# Patient Record
Sex: FEMALE | Race: BLACK OR AFRICAN AMERICAN | NOT HISPANIC OR LATINO | Employment: FULL TIME | URBAN - METROPOLITAN AREA
[De-identification: names, ages, dates, MRNs, and addresses within clinical notes are randomized per-mention and may not be internally consistent; named-entity substitution may affect disease eponyms.]

---

## 2023-08-21 PROBLEM — R13.10 DYSPHAGIA: Status: ACTIVE | Noted: 2023-08-21

## 2023-08-21 PROBLEM — E78.5 HLD (HYPERLIPIDEMIA): Status: ACTIVE | Noted: 2023-08-21

## 2023-08-21 PROBLEM — R09.A2 GLOBUS SENSATION: Status: ACTIVE | Noted: 2023-08-21

## 2023-08-21 PROBLEM — M67.441 GANGLION OF FLEXOR TENDON SHEATH OF RIGHT RING FINGER: Status: ACTIVE | Noted: 2023-08-21

## 2023-08-21 PROBLEM — M76.822 POSTERIOR TIBIAL TENDINITIS OF LEFT LEG: Status: ACTIVE | Noted: 2023-08-21

## 2023-08-21 PROBLEM — M79.602 PAIN OF LEFT UPPER EXTREMITY: Status: ACTIVE | Noted: 2023-08-21

## 2023-08-21 PROBLEM — Z20.822 SUSPECTED COVID-19 VIRUS INFECTION: Status: ACTIVE | Noted: 2023-08-21

## 2023-08-21 PROBLEM — R03.0 ELEVATED BLOOD-PRESSURE READING, WITHOUT DIAGNOSIS OF HYPERTENSION: Status: ACTIVE | Noted: 2023-08-21

## 2023-08-21 PROBLEM — J45.901 MODERATE ASTHMA WITH ACUTE EXACERBATION (HHS-HCC): Status: ACTIVE | Noted: 2023-08-21

## 2023-08-21 PROBLEM — L20.9 ATOPIC DERMATITIS: Status: ACTIVE | Noted: 2023-08-21

## 2023-08-21 PROBLEM — I10 HYPERTENSIVE DISORDER: Status: ACTIVE | Noted: 2023-08-21

## 2023-08-21 PROBLEM — F32.A DEPRESSION: Status: ACTIVE | Noted: 2023-08-21

## 2023-08-21 PROBLEM — N95.2 VAGINAL ATROPHY: Status: ACTIVE | Noted: 2023-08-21

## 2023-08-21 PROBLEM — M53.3 SACROILIAC JOINT PAIN: Status: ACTIVE | Noted: 2023-08-21

## 2023-08-21 PROBLEM — M79.18 MYALGIA, OTHER SITE: Status: ACTIVE | Noted: 2023-08-21

## 2023-08-21 PROBLEM — R29.898 ARM WEAKNESS: Status: ACTIVE | Noted: 2023-08-21

## 2023-08-21 PROBLEM — N02.9 THIN BASEMENT MEMBRANE DISEASE: Status: ACTIVE | Noted: 2023-08-21

## 2023-08-21 PROBLEM — R39.9 UTI SYMPTOMS: Status: ACTIVE | Noted: 2023-08-21

## 2023-08-21 PROBLEM — G54.0 THORACIC OUTLET SYNDROME: Status: ACTIVE | Noted: 2023-08-21

## 2023-08-21 PROBLEM — M79.673 FOOT PAIN: Status: ACTIVE | Noted: 2023-08-21

## 2023-08-21 PROBLEM — M54.6 THORACIC SPINE PAIN: Status: ACTIVE | Noted: 2023-08-21

## 2023-08-21 PROBLEM — M54.30 SCIATICA: Status: ACTIVE | Noted: 2023-08-21

## 2023-08-21 PROBLEM — M99.09 SEGMENTAL AND SOMATIC DYSFUNCTION: Status: ACTIVE | Noted: 2023-08-21

## 2023-08-21 PROBLEM — F43.21 ADJUSTMENT DISORDER WITH DEPRESSED MOOD: Status: ACTIVE | Noted: 2023-08-21

## 2023-08-21 PROBLEM — N95.1 PERIMENOPAUSAL: Status: ACTIVE | Noted: 2023-08-21

## 2023-08-21 PROBLEM — N95.1 VASOMOTOR SYMPTOMS DUE TO MENOPAUSE: Status: ACTIVE | Noted: 2023-08-21

## 2023-08-21 PROBLEM — B35.6 TINEA CRURIS: Status: ACTIVE | Noted: 2023-08-21

## 2023-08-21 PROBLEM — M21.42 ACQUIRED PES PLANUS OF BOTH FEET: Status: ACTIVE | Noted: 2023-08-21

## 2023-08-21 PROBLEM — J98.8 RESPIRATORY TRACT INFECTION: Status: ACTIVE | Noted: 2023-08-21

## 2023-08-21 PROBLEM — R53.83 FATIGUE: Status: ACTIVE | Noted: 2023-08-21

## 2023-08-21 PROBLEM — M20.11 ACQUIRED HALLUX VALGUS OF RIGHT FOOT: Status: ACTIVE | Noted: 2023-08-21

## 2023-08-21 PROBLEM — B96.89 BACTERIAL VAGINOSIS: Status: ACTIVE | Noted: 2023-08-21

## 2023-08-21 PROBLEM — M54.50 LUMBAR PAIN: Status: ACTIVE | Noted: 2023-08-21

## 2023-08-21 PROBLEM — M54.2 CERVICALGIA: Status: ACTIVE | Noted: 2023-08-21

## 2023-08-21 PROBLEM — E04.1 THYROID NODULE: Status: ACTIVE | Noted: 2023-08-21

## 2023-08-21 PROBLEM — R10.2 FEMALE PELVIC PAIN: Status: ACTIVE | Noted: 2023-08-21

## 2023-08-21 PROBLEM — M25.561 RIGHT KNEE PAIN: Status: ACTIVE | Noted: 2023-08-21

## 2023-08-21 PROBLEM — S76.019A MUSCLE STRAIN OF GLUTEAL REGION: Status: ACTIVE | Noted: 2023-08-21

## 2023-08-21 PROBLEM — R51.9 HEADACHE: Status: ACTIVE | Noted: 2023-08-21

## 2023-08-21 PROBLEM — S46.812A STRAIN OF LEFT TRAPEZIUS MUSCLE: Status: ACTIVE | Noted: 2023-08-21

## 2023-08-21 PROBLEM — S83.249A MEDIAL MENISCUS TEAR: Status: ACTIVE | Noted: 2023-08-21

## 2023-08-21 PROBLEM — A60.00 GENITAL HERPES: Status: ACTIVE | Noted: 2023-08-21

## 2023-08-21 PROBLEM — M17.0 LOCALIZED OSTEOARTHRITIS OF BOTH KNEES: Status: ACTIVE | Noted: 2023-08-21

## 2023-08-21 PROBLEM — M41.9 SCOLIOSIS: Status: ACTIVE | Noted: 2023-08-21

## 2023-08-21 PROBLEM — B37.9 YEAST INFECTION: Status: ACTIVE | Noted: 2023-08-21

## 2023-08-21 PROBLEM — N89.8 VAGINAL DISCHARGE: Status: ACTIVE | Noted: 2023-08-21

## 2023-08-21 PROBLEM — M25.562 KNEE PAIN, BILATERAL: Status: ACTIVE | Noted: 2023-08-21

## 2023-08-21 PROBLEM — J45.909 ASTHMA (HHS-HCC): Status: ACTIVE | Noted: 2023-08-21

## 2023-08-21 PROBLEM — M25.561 KNEE PAIN, BILATERAL: Status: ACTIVE | Noted: 2023-08-21

## 2023-08-21 PROBLEM — R22.0 LOCALIZED SWELLING, MASS AND LUMP, HEAD: Status: ACTIVE | Noted: 2023-08-21

## 2023-08-21 PROBLEM — R31.9 HEMATURIA: Status: ACTIVE | Noted: 2023-08-21

## 2023-08-21 PROBLEM — M25.551 RIGHT HIP PAIN: Status: ACTIVE | Noted: 2023-08-21

## 2023-08-21 PROBLEM — M62.81 MUSCLE WEAKNESS (GENERALIZED): Status: ACTIVE | Noted: 2023-08-21

## 2023-08-21 PROBLEM — N89.8 VAGINAL ITCHING: Status: ACTIVE | Noted: 2023-08-21

## 2023-08-21 PROBLEM — R32 LEAKING OF URINE: Status: ACTIVE | Noted: 2023-08-21

## 2023-08-21 PROBLEM — M22.41 CHONDROMALACIA OF RIGHT PATELLA: Status: ACTIVE | Noted: 2023-08-21

## 2023-08-21 PROBLEM — M21.41 ACQUIRED PES PLANUS OF BOTH FEET: Status: ACTIVE | Noted: 2023-08-21

## 2023-08-21 PROBLEM — N76.0 BACTERIAL VAGINOSIS: Status: ACTIVE | Noted: 2023-08-21

## 2023-08-21 PROBLEM — S46.919A MUSCLE STRAIN, SHOULDER REGION: Status: ACTIVE | Noted: 2023-08-21

## 2023-08-21 PROBLEM — F41.9 ANXIETY DISORDER: Status: ACTIVE | Noted: 2023-08-21

## 2023-08-21 PROBLEM — M22.42 CHONDROMALACIA OF LEFT PATELLA: Status: ACTIVE | Noted: 2023-08-21

## 2023-08-21 PROBLEM — M62.838 MUSCLE SPASMS OF NECK: Status: ACTIVE | Noted: 2023-08-21

## 2023-08-21 PROBLEM — M76.821 POSTERIOR TIBIAL TENDINITIS OF RIGHT LEG: Status: ACTIVE | Noted: 2023-08-21

## 2023-08-21 PROBLEM — L30.9 ECZEMA: Status: ACTIVE | Noted: 2023-08-21

## 2023-08-21 RX ORDER — ESTRADIOL 10 UG/1
INSERT VAGINAL
COMMUNITY
Start: 2023-04-07 | End: 2023-08-22 | Stop reason: ALTCHOICE

## 2023-08-21 RX ORDER — MOMETASONE FUROATE AND FORMOTEROL FUMARATE DIHYDRATE 50; 5 UG/1; UG/1
AEROSOL RESPIRATORY (INHALATION)
COMMUNITY
End: 2023-08-22 | Stop reason: SDUPTHER

## 2023-08-21 RX ORDER — VALACYCLOVIR HYDROCHLORIDE 500 MG/1
TABLET, FILM COATED ORAL
COMMUNITY
Start: 2023-01-17 | End: 2024-03-19 | Stop reason: SDUPTHER

## 2023-08-21 RX ORDER — CLOTRIMAZOLE AND BETAMETHASONE DIPROPIONATE 10; .64 MG/G; MG/G
1 CREAM TOPICAL 2 TIMES DAILY
COMMUNITY
Start: 2023-01-09

## 2023-08-21 RX ORDER — ESTRADIOL 0.03 MG/D
PATCH TRANSDERMAL
COMMUNITY
Start: 2023-03-08 | End: 2023-11-27 | Stop reason: WASHOUT

## 2023-08-21 RX ORDER — MULTIVITAMIN
1 TABLET ORAL DAILY
COMMUNITY

## 2023-08-21 RX ORDER — HYALURONATE,MOD.,NON-CROSSLINK 24 MG/3 ML
SYRINGE (ML) INTRAARTICULAR
COMMUNITY
Start: 2021-11-18

## 2023-08-21 RX ORDER — CRISABOROLE 20 MG/G
OINTMENT TOPICAL
COMMUNITY

## 2023-08-21 RX ORDER — ALBUTEROL SULFATE 90 UG/1
AEROSOL, METERED RESPIRATORY (INHALATION)
COMMUNITY
Start: 2019-05-24

## 2023-08-21 RX ORDER — OXYBUTYNIN CHLORIDE 5 MG/1
1 TABLET ORAL DAILY
COMMUNITY
Start: 2023-04-03 | End: 2023-08-22 | Stop reason: ALTCHOICE

## 2023-08-22 ENCOUNTER — OFFICE VISIT (OUTPATIENT)
Dept: PRIMARY CARE | Facility: CLINIC | Age: 43
End: 2023-08-22
Payer: COMMERCIAL

## 2023-08-22 ENCOUNTER — LAB (OUTPATIENT)
Dept: LAB | Facility: LAB | Age: 43
End: 2023-08-22
Payer: COMMERCIAL

## 2023-08-22 VITALS
BODY MASS INDEX: 30.95 KG/M2 | DIASTOLIC BLOOD PRESSURE: 92 MMHG | HEIGHT: 67 IN | OXYGEN SATURATION: 97 % | SYSTOLIC BLOOD PRESSURE: 132 MMHG | WEIGHT: 197.2 LBS | RESPIRATION RATE: 18 BRPM | HEART RATE: 79 BPM

## 2023-08-22 DIAGNOSIS — N02.9 THIN BASEMENT MEMBRANE DISEASE: ICD-10-CM

## 2023-08-22 DIAGNOSIS — Z72.51 HIGH RISK HETEROSEXUAL BEHAVIOR: ICD-10-CM

## 2023-08-22 DIAGNOSIS — Z00.00 ANNUAL PHYSICAL EXAM: Primary | ICD-10-CM

## 2023-08-22 DIAGNOSIS — J45.909 MILD ASTHMA, UNSPECIFIED WHETHER COMPLICATED, UNSPECIFIED WHETHER PERSISTENT (HHS-HCC): ICD-10-CM

## 2023-08-22 PROBLEM — R39.9 UTI SYMPTOMS: Status: RESOLVED | Noted: 2023-08-21 | Resolved: 2023-08-22

## 2023-08-22 PROCEDURE — 1036F TOBACCO NON-USER: CPT | Performed by: STUDENT IN AN ORGANIZED HEALTH CARE EDUCATION/TRAINING PROGRAM

## 2023-08-22 PROCEDURE — 99213 OFFICE O/P EST LOW 20 MIN: CPT | Performed by: STUDENT IN AN ORGANIZED HEALTH CARE EDUCATION/TRAINING PROGRAM

## 2023-08-22 PROCEDURE — 36415 COLL VENOUS BLD VENIPUNCTURE: CPT

## 2023-08-22 PROCEDURE — 3075F SYST BP GE 130 - 139MM HG: CPT | Performed by: STUDENT IN AN ORGANIZED HEALTH CARE EDUCATION/TRAINING PROGRAM

## 2023-08-22 PROCEDURE — 3008F BODY MASS INDEX DOCD: CPT | Performed by: STUDENT IN AN ORGANIZED HEALTH CARE EDUCATION/TRAINING PROGRAM

## 2023-08-22 PROCEDURE — 3080F DIAST BP >= 90 MM HG: CPT | Performed by: STUDENT IN AN ORGANIZED HEALTH CARE EDUCATION/TRAINING PROGRAM

## 2023-08-22 PROCEDURE — 99396 PREV VISIT EST AGE 40-64: CPT | Performed by: STUDENT IN AN ORGANIZED HEALTH CARE EDUCATION/TRAINING PROGRAM

## 2023-08-22 PROCEDURE — 87389 HIV-1 AG W/HIV-1&-2 AB AG IA: CPT

## 2023-08-22 RX ORDER — MOMETASONE FUROATE AND FORMOTEROL FUMARATE DIHYDRATE 50; 5 UG/1; UG/1
2 AEROSOL RESPIRATORY (INHALATION) DAILY
Qty: 13 G | Refills: 6 | Status: SHIPPED | OUTPATIENT
Start: 2023-08-22 | End: 2023-08-29

## 2023-08-22 RX ORDER — SEMAGLUTIDE 0.5 MG/.5ML
0.5 INJECTION, SOLUTION SUBCUTANEOUS
Qty: 2 ML | Refills: 0 | Status: SHIPPED | OUTPATIENT
Start: 2023-08-22 | End: 2023-09-11 | Stop reason: SDUPTHER

## 2023-08-22 ASSESSMENT — PATIENT HEALTH QUESTIONNAIRE - PHQ9: 2. FEELING DOWN, DEPRESSED OR HOPELESS: NOT AT ALL

## 2023-08-22 NOTE — PROGRESS NOTES
History Of Present Illness  Kriss Jhaveri is a 43 y.o. female presenting cpe.    Has been having HSV-2 flares every month, 3 outbreaks since   Started estrogen 1/2 patch  Valcyclovir daily for suppressive therapy    Taking aberden for menopausal       Pain in the groin area  Swollen gland, knot, tenderness in the area   One sided   For the past week         Back to working out   Starting wegovy      Past Medical History  She has a past medical history of Abnormal uterine and vaginal bleeding, unspecified (11/09/2015), Acute maxillary sinusitis, unspecified (04/02/2016), Acute pharyngitis, unspecified (02/24/2016), Acute vaginitis (08/15/2017), Acute vaginitis (03/02/2022), Acute vaginitis (02/02/2018), Contact with and (suspected) exposure to infections with a predominantly sexual mode of transmission (03/26/2014), Cough, unspecified (04/23/2016), Cough, unspecified (09/29/2015), Disruption of external operation (surgical) wound, not elsewhere classified, initial encounter (02/16/2016), Encounter for gynecological examination (general) (routine) without abnormal findings (08/15/2017), Encounter for gynecological examination (general) (routine) without abnormal findings (09/06/2019), Encounter for screening for infections with a predominantly sexual mode of transmission (09/28/2021), Encounter for screening for lipoid disorders (05/06/2016), Nausea (09/17/2020), Other acute postprocedural pain (01/22/2016), Other conditions influencing health status, Other specified abnormal findings of blood chemistry (07/14/2016), Other specified noninflammatory disorders of uterus, Other specified postprocedural states (05/10/2016), Pain in left knee (04/27/2016), Pain in unspecified limb, Personal history of other diseases of the circulatory system (03/26/2014), Personal history of other diseases of the digestive system (02/16/2016), Personal history of other diseases of the female genital tract (08/04/2015), Personal history  of other diseases of the female genital tract (11/09/2015), Personal history of other diseases of the female genital tract (03/20/2015), Personal history of other diseases of the musculoskeletal system and connective tissue, Personal history of other diseases of the respiratory system (02/24/2016), Personal history of other diseases of the respiratory system (03/04/2018), Personal history of other diseases of the respiratory system, Personal history of other infectious and parasitic diseases (09/28/2021), Personal history of other infectious and parasitic diseases (10/12/2016), Personal history of other medical treatment, Personal history of other specified conditions (05/01/2021), Personal history of other specified conditions (02/08/2015), Personal history of other specified conditions (12/22/2015), Personal history of other specified conditions (01/16/2015), Personal history of other specified conditions (09/17/2020), Personal history of other specified conditions, Personal history of other specified conditions (06/05/2017), Personal history of other specified conditions, Personal history of other specified conditions, Personal history of other specified conditions (04/28/2016), Personal history of urinary (tract) infections (08/30/2014), Postpartum depression (06/20/2013), Radiculopathy, cervical region (01/06/2021), Strain of unspecified muscle, fascia and tendon at shoulder and upper arm level, left arm, initial encounter (01/22/2021), Streptococcal pharyngitis (03/04/2018), Tachycardia, unspecified, Unspecified asthma with (acute) exacerbation (11/14/2016), Unspecified asthma with (acute) exacerbation (02/27/2020), Unspecified asthma, uncomplicated (02/08/2015), Unspecified pre-eclampsia, unspecified trimester, and Unspecified symptoms and signs involving the genitourinary system (12/14/2021).    Surgical History  She has a past surgical history that includes Other surgical history (06/20/2013); Tympanostomy  tube placement (2013); Other surgical history (2013); Other surgical history (2015);  section, classic (2018); and Other surgical history (2015).     Social History  She reports that she has never smoked. She has never used smokeless tobacco. She reports current alcohol use. She reports that she does not use drugs.    Family History  Family History   Problem Relation Name Age of Onset    Hypertension Mother      Diabetes Mother      Heart disease Father      Lung disease Father          Allergies  Delsym    Review of Systems     Physical Exam  Constitutional:       General: She is not in acute distress.     Appearance: She is not ill-appearing.   HENT:      Right Ear: Tympanic membrane and ear canal normal.      Left Ear: Tympanic membrane and ear canal normal.      Mouth/Throat:      Mouth: Mucous membranes are moist.      Pharynx: Oropharynx is clear. No oropharyngeal exudate or posterior oropharyngeal erythema.   Eyes:      Extraocular Movements: Extraocular movements intact.      Conjunctiva/sclera: Conjunctivae normal.      Pupils: Pupils are equal, round, and reactive to light.   Neck:      Vascular: No carotid bruit.   Cardiovascular:      Rate and Rhythm: Normal rate and regular rhythm.      Heart sounds: No murmur heard.     No gallop.   Pulmonary:      Effort: Pulmonary effort is normal.      Breath sounds: Normal breath sounds. No wheezing, rhonchi or rales.   Abdominal:      General: Abdomen is flat. Bowel sounds are normal.      Palpations: Abdomen is soft.      Tenderness: There is no abdominal tenderness.   Musculoskeletal:      Cervical back: Neck supple.      Left lower leg: No edema.   Skin:     General: Skin is warm and dry.      Findings: No rash.   Neurological:      General: No focal deficit present.      Mental Status: She is alert and oriented to person, place, and time.      Gait: Gait normal.   Psychiatric:         Mood and Affect: Mood normal.          Behavior: Behavior normal.          Last Recorded Vitals  BP (!) 132/92   Pulse 79   Resp 18   Wt 89.4 kg (197 lb 3.2 oz)   SpO2 97%     Relevant Results  Current Outpatient Medications:     albuterol 90 mcg/actuation inhaler, Inhale., Disp: , Rfl:     BACILLUS COAGULANS-INULIN ORAL, Take 1 capsule by mouth once daily., Disp: , Rfl:     clotrimazole-betamethasone (Lotrisone) cream, Apply 1 Film topically 2 times a day., Disp: , Rfl:     crisaborole (Eucrisa) 2 % ointment, Eucrisa 2 % External Ointment Refills: 0, Disp: , Rfl:     estradiol (Climara) 0.025 mg/24 hr patch, Place on the skin., Disp: , Rfl:     fluticasone furoate-vilanteroL (Breo Ellipta) 100-25 mcg/dose inhaler, Inhale 1 puff once daily., Disp: 60 each, Rfl: 0    hyaluronan (Hymovis) 24 mg/3 mL injection, Inject into the joint., Disp: , Rfl:     multivitamin tablet, Take 1 tablet by mouth once daily., Disp: , Rfl:     semaglutide, weight loss, (Wegovy) 0.5 mg/0.5 mL pen injector, Inject 0.5 mg under the skin 1 (one) time per week for 4 doses., Disp: 2 mL, Rfl: 0    valACYclovir (Valtrex) 500 mg tablet, Take by mouth., Disp: , Rfl:           Assessment/Plan   Problem List Items Addressed This Visit       Asthma    Relevant Medications    fluticasone furoate-vilanteroL (Breo Ellipta) 100-25 mcg/dose inhaler    Thin basement membrane disease     Other Visit Diagnoses       Annual physical exam    -  Primary    High risk heterosexual behavior        Relevant Orders    HIV 1/2 antibodies, rapid (Completed)    Adult BMI 31.0-31.9 kg/sq m        BMI 30.0-30.9,adult        Relevant Medications    semaglutide, weight loss, (Wegovy) 0.5 mg/0.5 mL pen injector                   Chapis Olivo,

## 2023-08-23 LAB — HIV 1/ 2 AG/AB SCREEN: NONREACTIVE

## 2023-08-29 RX ORDER — FLUTICASONE FUROATE AND VILANTEROL 100; 25 UG/1; UG/1
1 POWDER RESPIRATORY (INHALATION) DAILY
Qty: 60 EACH | Refills: 0 | Status: SHIPPED | OUTPATIENT
Start: 2023-08-29 | End: 2023-09-22

## 2023-08-29 RX ORDER — MOMETASONE FUROATE AND FORMOTEROL FUMARATE DIHYDRATE 50; 5 UG/1; UG/1
2 AEROSOL RESPIRATORY (INHALATION) 2 TIMES DAILY
Qty: 13 G | Refills: 1 | Status: SHIPPED | OUTPATIENT
Start: 2023-08-29 | End: 2023-08-29 | Stop reason: SINTOL

## 2023-09-11 RX ORDER — SEMAGLUTIDE 0.5 MG/.5ML
0.5 INJECTION, SOLUTION SUBCUTANEOUS
Qty: 2 ML | Refills: 0 | Status: SHIPPED | OUTPATIENT
Start: 2023-09-11 | End: 2023-11-27 | Stop reason: WASHOUT

## 2023-09-22 ENCOUNTER — OFFICE VISIT (OUTPATIENT)
Dept: PRIMARY CARE | Facility: CLINIC | Age: 43
End: 2023-09-22
Payer: COMMERCIAL

## 2023-09-22 VITALS
RESPIRATION RATE: 18 BRPM | DIASTOLIC BLOOD PRESSURE: 80 MMHG | SYSTOLIC BLOOD PRESSURE: 116 MMHG | WEIGHT: 198 LBS | HEIGHT: 68 IN | HEART RATE: 80 BPM | OXYGEN SATURATION: 98 % | BODY MASS INDEX: 30.01 KG/M2

## 2023-09-22 DIAGNOSIS — J45.901 EXACERBATION OF ASTHMA, UNSPECIFIED ASTHMA SEVERITY, UNSPECIFIED WHETHER PERSISTENT (HHS-HCC): ICD-10-CM

## 2023-09-22 DIAGNOSIS — J45.909 MILD ASTHMA, UNSPECIFIED WHETHER COMPLICATED, UNSPECIFIED WHETHER PERSISTENT (HHS-HCC): Primary | ICD-10-CM

## 2023-09-22 DIAGNOSIS — R79.89 ELEVATED SERUM HCG: ICD-10-CM

## 2023-09-22 PROCEDURE — 3008F BODY MASS INDEX DOCD: CPT | Performed by: STUDENT IN AN ORGANIZED HEALTH CARE EDUCATION/TRAINING PROGRAM

## 2023-09-22 PROCEDURE — 3074F SYST BP LT 130 MM HG: CPT | Performed by: STUDENT IN AN ORGANIZED HEALTH CARE EDUCATION/TRAINING PROGRAM

## 2023-09-22 PROCEDURE — 1036F TOBACCO NON-USER: CPT | Performed by: STUDENT IN AN ORGANIZED HEALTH CARE EDUCATION/TRAINING PROGRAM

## 2023-09-22 PROCEDURE — 3079F DIAST BP 80-89 MM HG: CPT | Performed by: STUDENT IN AN ORGANIZED HEALTH CARE EDUCATION/TRAINING PROGRAM

## 2023-09-22 PROCEDURE — 99214 OFFICE O/P EST MOD 30 MIN: CPT | Performed by: STUDENT IN AN ORGANIZED HEALTH CARE EDUCATION/TRAINING PROGRAM

## 2023-09-22 RX ORDER — MOMETASONE FUROATE AND FORMOTEROL FUMARATE DIHYDRATE 50; 5 UG/1; UG/1
2 AEROSOL RESPIRATORY (INHALATION)
Qty: 13 G | Refills: 2 | Status: SHIPPED | OUTPATIENT
Start: 2023-09-22 | End: 2023-11-27 | Stop reason: WASHOUT

## 2023-09-22 NOTE — LETTER
September 22, 2023     Patient: Kriss Jhaveri   YOB: 1980   Date of Visit: 9/22/2023       To Whom It May Concern:    Kriss Jhaveri was seen in my clinic on 9/22/2023 at 12:20 pm. Please excuse Kriss for her absence from work on this day to make the appointment.    If you have any questions or concerns, please don't hesitate to call.         Sincerely,         Chapis Olivo,         CC: No Recipients

## 2023-09-22 NOTE — PROGRESS NOTES
Subjective   Patient ID: Kriss Jhaveri is a 43 y.o. female who presents for Hospital Follow-up (Patient in today for hospital F/U, she would like to discuss D-dimer lab and also wants a referral for Pulm. ).    Hospital follow-up.  Patient was evaluated at the [facility] on 9/17/2023 due to symptoms of shortness of breath and chest pressure.  Patient states that for about a week she has been noticing some increasing shortness of breath and cough that was triggering her asthma pretty severely.  She did have a sick contact of her son who was sick the week prior.  Due to her symptoms of increasing shortness of breath that was refractory to her albuterol which she was using every 1-2 hours she presented to the emergency room.    When the visit to the emergency room patient states she had a significantly long wait time.  She endorses having increased work of breathing throughout the visit and experienced a asthma attack during the stay which resulted in a syncopal episode.     Chest x-ray negative  CT PE was negative  Beta hCG minor elevation  Troponin negative  EKG normal sinus rhythm.     Patient presented again to the emergency department on 9/19/23 due to continued symptoms.  She was negative for COVID, influenza, RSV.  She was determined to have a viral illness continued symptomatic care.    Patient started taking antihistamine which provided some relief.  She also noticed increased symptom relief with alcohol.  With a combination of the 2 she was able to sleep.  Today she is feeling much better just a bit of hoarseness to her voice.       Patient states that since stopping her Dulera she has noticed increasing symptoms.  The Breo and Spiriva that were previously tried were unsuccessful at home maintaining her symptoms of asthma.  She felt much better relief with the Dulera.  Without  hospital admissions.          Objective   Physical Exam  Vitals reviewed.   Constitutional:       Appearance: Normal  appearance.   Cardiovascular:      Rate and Rhythm: Normal rate and regular rhythm.      Heart sounds: No murmur heard.  Pulmonary:      Effort: Pulmonary effort is normal. No respiratory distress.      Breath sounds: Normal breath sounds. No wheezing.   Musculoskeletal:      Cervical back: Neck supple.      Left lower leg: No edema.   Neurological:      Mental Status: She is alert.         Assessment/Plan   Diagnoses and all orders for this visit:  Mild asthma, unspecified whether complicated, unspecified whether persistent  Comments:  ED admission reviewed  Pulmonary function test ordered  Pulmonology referral ordered  Orders:  -     CBC and Auto Differential; Future  -     Comprehensive metabolic panel; Future  -     Respiratory Allergy Profile IgE; Future  -     Pulmonary function test  -     Referral to Pulmonology; Future  -     Dulera 50-5 mcg/actuation HFA aerosol inhaler inhaler; Inhale 2 puffs 2 times a day.  Exacerbation of asthma, unspecified asthma severity, unspecified whether persistent  Elevated serum hCG  -     HCG, quantitative, pregnancy; Future

## 2023-09-23 ENCOUNTER — LAB (OUTPATIENT)
Dept: LAB | Facility: LAB | Age: 43
End: 2023-09-23
Payer: COMMERCIAL

## 2023-09-23 DIAGNOSIS — J45.909 MILD ASTHMA, UNSPECIFIED WHETHER COMPLICATED, UNSPECIFIED WHETHER PERSISTENT (HHS-HCC): ICD-10-CM

## 2023-09-23 DIAGNOSIS — R79.89 ELEVATED SERUM HCG: ICD-10-CM

## 2023-09-23 LAB
BASOPHILS (10*3/UL) IN BLOOD BY AUTOMATED COUNT: 0.04 X10E9/L (ref 0–0.1)
BASOPHILS/100 LEUKOCYTES IN BLOOD BY AUTOMATED COUNT: 0.6 % (ref 0–2)
EOSINOPHILS (10*3/UL) IN BLOOD BY AUTOMATED COUNT: 0.26 X10E9/L (ref 0–0.7)
EOSINOPHILS/100 LEUKOCYTES IN BLOOD BY AUTOMATED COUNT: 3.8 % (ref 0–6)
ERYTHROCYTE DISTRIBUTION WIDTH (RATIO) BY AUTOMATED COUNT: 13.1 % (ref 11.5–14.5)
ERYTHROCYTE MEAN CORPUSCULAR HEMOGLOBIN CONCENTRATION (G/DL) BY AUTOMATED: 33.2 G/DL (ref 32–36)
ERYTHROCYTE MEAN CORPUSCULAR VOLUME (FL) BY AUTOMATED COUNT: 92 FL (ref 80–100)
ERYTHROCYTES (10*6/UL) IN BLOOD BY AUTOMATED COUNT: 4.37 X10E12/L (ref 4–5.2)
HEMATOCRIT (%) IN BLOOD BY AUTOMATED COUNT: 40.1 % (ref 36–46)
HEMOGLOBIN (G/DL) IN BLOOD: 13.3 G/DL (ref 12–16)
IMMATURE GRANULOCYTES/100 LEUKOCYTES IN BLOOD BY AUTOMATED COUNT: 0.4 % (ref 0–0.9)
LEUKOCYTES (10*3/UL) IN BLOOD BY AUTOMATED COUNT: 6.8 X10E9/L (ref 4.4–11.3)
LYMPHOCYTES (10*3/UL) IN BLOOD BY AUTOMATED COUNT: 2.12 X10E9/L (ref 1.2–4.8)
LYMPHOCYTES/100 LEUKOCYTES IN BLOOD BY AUTOMATED COUNT: 31.2 % (ref 13–44)
MONOCYTES (10*3/UL) IN BLOOD BY AUTOMATED COUNT: 0.42 X10E9/L (ref 0.1–1)
MONOCYTES/100 LEUKOCYTES IN BLOOD BY AUTOMATED COUNT: 6.2 % (ref 2–10)
NEUTROPHILS (10*3/UL) IN BLOOD BY AUTOMATED COUNT: 3.92 X10E9/L (ref 1.2–7.7)
NEUTROPHILS/100 LEUKOCYTES IN BLOOD BY AUTOMATED COUNT: 57.8 % (ref 40–80)
NRBC (PER 100 WBCS) BY AUTOMATED COUNT: 0 /100 WBC (ref 0–0)
PLATELETS (10*3/UL) IN BLOOD AUTOMATED COUNT: 309 X10E9/L (ref 150–450)

## 2023-09-23 PROCEDURE — 36415 COLL VENOUS BLD VENIPUNCTURE: CPT

## 2023-09-23 PROCEDURE — 86003 ALLG SPEC IGE CRUDE XTRC EA: CPT

## 2023-09-23 PROCEDURE — 80053 COMPREHEN METABOLIC PANEL: CPT

## 2023-09-23 PROCEDURE — 85025 COMPLETE CBC W/AUTO DIFF WBC: CPT

## 2023-09-23 PROCEDURE — 84702 CHORIONIC GONADOTROPIN TEST: CPT

## 2023-09-23 PROCEDURE — 82785 ASSAY OF IGE: CPT

## 2023-09-24 LAB
ALANINE AMINOTRANSFERASE (SGPT) (U/L) IN SER/PLAS: 11 U/L (ref 7–45)
ALBUMIN (G/DL) IN SER/PLAS: 3.9 G/DL (ref 3.4–5)
ALKALINE PHOSPHATASE (U/L) IN SER/PLAS: 53 U/L (ref 33–110)
ANION GAP IN SER/PLAS: 11 MMOL/L (ref 10–20)
ASPARTATE AMINOTRANSFERASE (SGOT) (U/L) IN SER/PLAS: 16 U/L (ref 9–39)
BILIRUBIN TOTAL (MG/DL) IN SER/PLAS: 0.5 MG/DL (ref 0–1.2)
CALCIUM (MG/DL) IN SER/PLAS: 9.8 MG/DL (ref 8.6–10.6)
CARBON DIOXIDE, TOTAL (MMOL/L) IN SER/PLAS: 31 MMOL/L (ref 21–32)
CHLORIDE (MMOL/L) IN SER/PLAS: 104 MMOL/L (ref 98–107)
CHORIOGONADOTROPIN (MIU/ML) IN SER/PLAS: 9 IU/L
CREATININE (MG/DL) IN SER/PLAS: 0.92 MG/DL (ref 0.5–1.05)
GFR FEMALE: 80 ML/MIN/1.73M2
GLUCOSE (MG/DL) IN SER/PLAS: 85 MG/DL (ref 74–99)
POTASSIUM (MMOL/L) IN SER/PLAS: 4.8 MMOL/L (ref 3.5–5.3)
PROTEIN TOTAL: 6.4 G/DL (ref 6.4–8.2)
SODIUM (MMOL/L) IN SER/PLAS: 141 MMOL/L (ref 136–145)
UREA NITROGEN (MG/DL) IN SER/PLAS: 14 MG/DL (ref 6–23)

## 2023-09-26 LAB
ALLERGEN ANIMAL: CAT DANDER IGE (KU/L): <0.1 KU/L
ALLERGEN ANIMAL: DOG DANDER IGE (KU/L): <0.1 KU/L
ALLERGEN GRASS: BERMUDA GRASS (CYNODON DACTYLON) IGE (KU/L): <0.1 KU/L
ALLERGEN GRASS: JOHNSON GRASS (SORGHUM HALEPENSE) IGE (KU/L): <0.1 KU/L
ALLERGEN GRASS: MEADOW GRASS, KENTUCKY BLUE (POA PRATENSIS )IGE (KU/L): <0.1 KU/L
ALLERGEN GRASS: TIMOTHY GRASS (PHLEUM PRATENSE) IGE (KU/L): <0.1 KU/L
ALLERGEN INSECT: COCKROACH IGE: <0.1 KU/L
ALLERGEN MICROORGANISM: ALTERNARIA ALTERNATA IGE (KU/L): <0.1 KU/L
ALLERGEN MICROORGANISM: ASPERGILLUS FUMIGATUS IGE (KU/L): <0.1 KU/L
ALLERGEN MICROORGANISM: CLADOSPORIUM HERBARUM IGE (KU/L): <0.1 KU/L
ALLERGEN MICROORGANISM: PENICILLIUM CHRYSOGENUM (P. NOTATUM) IGE (KU/L): <0.1 KU/L
ALLERGEN MITE: DERMATOPHAGOIDES FARINAE (HOUSE DUST MITE) IGE (KU/L): <0.1 KU/L
ALLERGEN MITE: DERMATOPHAGOIDES PTERONYSSINUS (HOUSE DUST MITE) IGE (KU/L): <0.1 KU/L
ALLERGEN TREE: BOX-ELDER (ACER NEGUNDO) IGE (KU/L): <0.1 KU/L
ALLERGEN TREE: COMMON SILVER BIRCH (BETULA VERRUCOSA) IGE (KU/L): <0.1 KU/L
ALLERGEN TREE: COTTONWOOD (POPULUS DELTOIDES) IGE (KU/L): <0.1 KU/L
ALLERGEN TREE: ELM (ULMUS AMERICANA) IGE (KU/L): <0.1 KU/L
ALLERGEN TREE: MAPLE LEAF SYCAMORE, LONDON PLANE IGE (KU/L): <0.1 KU/L
ALLERGEN TREE: MOUNTAIN JUNIPER (JUNIPERUS SABINOIDES) IGE (KU/L): <0.1 KU/L
ALLERGEN TREE: MULBERRY (MORUS ALBA) IGE (KU/L): <0.1 KU/L
ALLERGEN TREE: OAK (QUERCUS ALBA) IGE (KU/L): <0.1 KU/L
ALLERGEN TREE: PECAN, HICKORY (CARYA PECAN) IGE (KU/L): <0.1 KU/L
ALLERGEN TREE: WALNUT IGE: <0.1 KU/L
ALLERGEN TREE: WHITE ASH (FRAXINUS AMERICANA) IGE (KU/L): <0.1 KU/L
ALLERGEN WEED: COMMON PIGWEED (AMARANTHUS RETROFLEXUS) IGE (KU/L): <0.1 KU/L
ALLERGEN WEED: COMMON RAGWEED (AMB. ARTEMISIIFOLIA/A. ELATIOR) IGE (KU/L): <0.1 KU/L
ALLERGEN WEED: GOOSEFOOT, LAMB'S QUARTERS (CHENOPODIUM ALBUM) IGE (KU/L): <0.1 KU/L
ALLERGEN WEED: PLANTAIN (ENGLISH), RIBWORT (PLANTAGO LANCEOLATA) IGE (KU/L): <0.1 KU/L
ALLERGEN WEED: PRICKLY SALTWORT/RUSSIAN THISTLE (SALSOLA KALI) IGE (KU/L): <0.1 KU/L
ALLERGEN WEED: SHEEP SORREL (RUMEX ACETOSELLA) IGE (KU/L): <0.1 KU/L
IMMUNOCAP IGE: 79.1 KU/L (ref 0–214)
IMMUNOCAP INTERPRETATION: NORMAL

## 2023-10-04 ENCOUNTER — TELEMEDICINE (OUTPATIENT)
Dept: PRIMARY CARE | Facility: CLINIC | Age: 43
End: 2023-10-04
Payer: COMMERCIAL

## 2023-10-04 DIAGNOSIS — L20.84 ALLERGIC (INTRINSIC) ECZEMA: Primary | ICD-10-CM

## 2023-10-04 PROBLEM — B34.9 VIRAL ILLNESS: Status: ACTIVE | Noted: 2023-10-04

## 2023-10-04 PROBLEM — R07.9 NONSPECIFIC CHEST PAIN: Status: RESOLVED | Noted: 2023-10-04 | Resolved: 2023-10-04

## 2023-10-04 PROCEDURE — 99213 OFFICE O/P EST LOW 20 MIN: CPT

## 2023-10-04 RX ORDER — METHYLPREDNISOLONE 4 MG/1
TABLET ORAL
Qty: 21 TABLET | Refills: 0 | Status: SHIPPED | OUTPATIENT
Start: 2023-10-04 | End: 2023-10-11

## 2023-10-04 RX ORDER — TRIAMCINOLONE ACETONIDE 1 MG/G
OINTMENT TOPICAL 2 TIMES DAILY PRN
Qty: 60 G | Refills: 5 | Status: SHIPPED | OUTPATIENT
Start: 2023-10-04 | End: 2023-11-27 | Stop reason: WASHOUT

## 2023-10-04 ASSESSMENT — ENCOUNTER SYMPTOMS
SORE THROAT: 0
COUGH: 0
DIARRHEA: 0
EYE PAIN: 0
RHINORRHEA: 0
SHORTNESS OF BREATH: 0
FEVER: 0
NAIL CHANGES: 0
VOMITING: 0

## 2023-10-04 NOTE — PROGRESS NOTES
This visit was completed via video conference. All issues as below were discussed and addressed but no physical exam was performed. If it was felt that the patient should be evaluated in clinic than they were directed there. The patient verbally consented to the visit.    Subjective   Patient ID: Kriss Jhaveri is a 43 y.o. female who presents for No chief complaint on file..    Rash  This is a new problem. The current episode started in the past 7 days. The problem has been gradually worsening since onset. The affected locations include the face and lips. The rash is characterized by blistering, redness and swelling. Associated with: new inhaler 3 weeks ago, is HSV1 positive and has recently had a large flair. Associated symptoms include facial edema. Pertinent negatives include no congestion, cough, diarrhea, eye pain, fever, joint pain, nail changes, rhinorrhea, shortness of breath, sore throat or vomiting. (Lips feel puffy on the inside) Treatments tried: multi creams from home, flagyl, mupiracen, hydocortisone. The treatment provided no relief. Her past medical history is significant for asthma.        Review of Systems   Constitutional:  Negative for fever.   HENT:  Negative for congestion, rhinorrhea and sore throat.    Eyes:  Negative for pain.   Respiratory:  Negative for cough and shortness of breath.    Gastrointestinal:  Negative for diarrhea and vomiting.   Musculoskeletal:  Negative for joint pain.   Skin:  Positive for rash. Negative for nail changes.       Objective   There were no vitals taken for this visit.    Physical Exam pt seen via video feed to be in no acute distress, see attached photos for views of rash on lips/cheeks, Small while nodules, diffusely distributed across lips/cheeks, and per pt inside of mouth.     Assessment/Plan   Problem List Items Addressed This Visit    None  Visit Diagnoses         Codes    Allergic (intrinsic) eczema    -  Primary L20.84    Relevant Medications     triamcinolone (Kenalog) 0.1 % ointment    methylPREDNISolone (Medrol Dospak) 4 mg tablets

## 2023-10-10 ENCOUNTER — OFFICE VISIT (OUTPATIENT)
Dept: PRIMARY CARE | Facility: CLINIC | Age: 43
End: 2023-10-10
Payer: COMMERCIAL

## 2023-10-10 VITALS
WEIGHT: 198 LBS | RESPIRATION RATE: 16 BRPM | HEIGHT: 67 IN | OXYGEN SATURATION: 96 % | HEART RATE: 71 BPM | BODY MASS INDEX: 31.08 KG/M2 | DIASTOLIC BLOOD PRESSURE: 82 MMHG | SYSTOLIC BLOOD PRESSURE: 126 MMHG

## 2023-10-10 DIAGNOSIS — F41.9 ANXIETY DISORDER, UNSPECIFIED TYPE: ICD-10-CM

## 2023-10-10 DIAGNOSIS — N02.9 THIN BASEMENT MEMBRANE DISEASE: Primary | ICD-10-CM

## 2023-10-10 DIAGNOSIS — E28.319 PREMATURE MENOPAUSE: ICD-10-CM

## 2023-10-10 DIAGNOSIS — N95.2 VAGINAL ATROPHY: ICD-10-CM

## 2023-10-10 PROCEDURE — 3008F BODY MASS INDEX DOCD: CPT | Performed by: STUDENT IN AN ORGANIZED HEALTH CARE EDUCATION/TRAINING PROGRAM

## 2023-10-10 PROCEDURE — 3078F DIAST BP <80 MM HG: CPT | Performed by: STUDENT IN AN ORGANIZED HEALTH CARE EDUCATION/TRAINING PROGRAM

## 2023-10-10 PROCEDURE — 1036F TOBACCO NON-USER: CPT | Performed by: STUDENT IN AN ORGANIZED HEALTH CARE EDUCATION/TRAINING PROGRAM

## 2023-10-10 PROCEDURE — 99213 OFFICE O/P EST LOW 20 MIN: CPT | Performed by: STUDENT IN AN ORGANIZED HEALTH CARE EDUCATION/TRAINING PROGRAM

## 2023-10-10 PROCEDURE — 3074F SYST BP LT 130 MM HG: CPT | Performed by: STUDENT IN AN ORGANIZED HEALTH CARE EDUCATION/TRAINING PROGRAM

## 2023-10-10 ASSESSMENT — PATIENT HEALTH QUESTIONNAIRE - PHQ9
1. LITTLE INTEREST OR PLEASURE IN DOING THINGS: NOT AT ALL
2. FEELING DOWN, DEPRESSED OR HOPELESS: NOT AT ALL
SUM OF ALL RESPONSES TO PHQ9 QUESTIONS 1 AND 2: 0

## 2023-10-10 NOTE — PROGRESS NOTES
Subjective   Patient ID: Kriss Jhaveri is a 43 y.o. female who presents for Results (Pt is here to further discuss her results and questions.).    Started estrogen, magnesium 100mg daily  , ashwagonda 3000mg twice daily   Stopped her estrogen patch   Slippery elm bark for months   Lemon balm salve during flares     Symptoms of menopause: vaginal atrophy   Regression of diet      Objective   Physical Exam  Vitals reviewed.   Constitutional:       Appearance: Normal appearance.   Cardiovascular:      Rate and Rhythm: Normal rate and regular rhythm.      Heart sounds: No murmur heard.  Pulmonary:      Effort: Pulmonary effort is normal. No respiratory distress.      Breath sounds: Normal breath sounds. No wheezing.   Musculoskeletal:      Cervical back: Neck supple.      Left lower leg: No edema.   Neurological:      Mental Status: She is alert.         Assessment/Plan   Problem List Items Addressed This Visit             ICD-10-CM    Anxiety disorder F41.9    Relevant Orders    Cortisol (Completed)    DHEA level (Completed)    FSH (Completed)    Luteinizing hormone (Completed)    Estradiol (Completed)    Thin basement membrane disease - Primary N02.9    Relevant Orders    Cortisol (Completed)    DHEA level (Completed)    FSH (Completed)    Luteinizing hormone (Completed)    Estradiol (Completed)    Respiratory Allergy Profile IgE (Completed)    Vaginal atrophy N95.2    Relevant Orders    Cortisol (Completed)    DHEA level (Completed)    FSH (Completed)    Luteinizing hormone (Completed)    Estradiol (Completed)    Testosterone, total and free (Completed)     Other Visit Diagnoses         Codes    Premature menopause     E28.319    Relevant Orders    Testosterone, total and free (Completed)

## 2023-10-11 ENCOUNTER — LAB (OUTPATIENT)
Dept: LAB | Facility: LAB | Age: 43
End: 2023-10-11
Payer: COMMERCIAL

## 2023-10-11 DIAGNOSIS — N02.9 THIN BASEMENT MEMBRANE DISEASE: ICD-10-CM

## 2023-10-11 DIAGNOSIS — F41.9 ANXIETY DISORDER, UNSPECIFIED TYPE: ICD-10-CM

## 2023-10-11 DIAGNOSIS — E28.319 PREMATURE MENOPAUSE: ICD-10-CM

## 2023-10-11 DIAGNOSIS — N95.2 VAGINAL ATROPHY: ICD-10-CM

## 2023-10-11 LAB
CORTIS SERPL-MCNC: 14 UG/DL (ref 2.5–20)
ESTRADIOL SERPL-MCNC: <19 PG/ML
FSH SERPL-ACNC: 141 IU/L
LH SERPL-ACNC: 106.6 IU/L

## 2023-10-11 PROCEDURE — 36415 COLL VENOUS BLD VENIPUNCTURE: CPT

## 2023-10-11 PROCEDURE — 84402 ASSAY OF FREE TESTOSTERONE: CPT

## 2023-10-11 PROCEDURE — 82670 ASSAY OF TOTAL ESTRADIOL: CPT

## 2023-10-11 PROCEDURE — 82533 TOTAL CORTISOL: CPT

## 2023-10-11 PROCEDURE — 86003 ALLG SPEC IGE CRUDE XTRC EA: CPT

## 2023-10-11 PROCEDURE — 82626 DEHYDROEPIANDROSTERONE: CPT

## 2023-10-11 PROCEDURE — 83001 ASSAY OF GONADOTROPIN (FSH): CPT

## 2023-10-11 PROCEDURE — 82785 ASSAY OF IGE: CPT

## 2023-10-11 PROCEDURE — 83002 ASSAY OF GONADOTROPIN (LH): CPT

## 2023-10-12 LAB
A ALTERNATA IGE QN: <0.1 KU/L
A FUMIGATUS IGE QN: <0.1 KU/L
BERMUDA GRASS IGE QN: <0.1 KU/L
BOXELDER IGE QN: <0.1 KU/L
C HERBARUM IGE QN: <0.1 KU/L
CALIF WALNUT POLN IGE QN: <0.1
CAT DANDER IGE QN: <0.1 KU/L
CMN PIGWEED IGE QN: <0.1 KU/L
COMMON RAGWEED IGE QN: <0.1 KU/L
COTTONWOOD IGE QN: <0.1 KU/L
D FARINAE IGE QN: <0.1 KU/L
D PTERONYSS IGE QN: <0.1 KU/L
DOG DANDER IGE QN: <0.1 KU/L
ENGL PLANTAIN IGE QN: <0.1
GOOSEFOOT IGE QN: <0.1 KU/L
JOHNSON GRASS IGE QN: <0.1 KU/L
KENT BLUE GRASS IGE QN: <0.1 KU/L
LONDON PLANE IGE QN: <0.1
MT JUNIPER IGE QN: <0.1
P NOTATUM IGE QN: <0.1 KU/L
PECAN/HICK TREE IGE QN: <0.1
ROACH IGE QN: <0.1 KU/L
SALTWORT IGE QN: <0.1 KU/L
SHEEP SORREL IGE QN: <0.1 KU/L
SILVER BIRCH IGE QN: <0.1 KU/L
TIMOTHY IGE QN: <0.1 KU/L
TOTAL IGE SMQN RAST: 73.1 KU/L
WHITE ASH IGE QN: <0.1 KU/L
WHITE ELM IGE QN: <0.1 KU/L
WHITE MULBERRY IGE QN: <0.1
WHITE OAK IGE QN: <0.1 KU/L

## 2023-10-15 LAB
DHEA SERPL-MCNC: 2.83 NG/ML (ref 0.63–4.7)
TESTOSTERONE FREE (CHAN): 3.2 PG/ML (ref 0.1–6.4)
TESTOSTERONE,TOTAL,LC-MS/MS: 32 NG/DL (ref 2–45)

## 2023-10-18 ENCOUNTER — TELEPHONE (OUTPATIENT)
Dept: OBSTETRICS AND GYNECOLOGY | Facility: CLINIC | Age: 43
End: 2023-10-18
Payer: COMMERCIAL

## 2023-10-18 NOTE — TELEPHONE ENCOUNTER
Pt verified by name and .  Pt is aware of Leonardo Santiago's message to schedule virtual appt in December.  Pt given number to call and schedule appt.  Pt has no questions or concerns at this time.

## 2023-10-26 ENCOUNTER — OFFICE VISIT (OUTPATIENT)
Dept: OBSTETRICS AND GYNECOLOGY | Facility: CLINIC | Age: 43
End: 2023-10-26
Payer: COMMERCIAL

## 2023-10-26 VITALS
HEIGHT: 68 IN | BODY MASS INDEX: 29.55 KG/M2 | DIASTOLIC BLOOD PRESSURE: 62 MMHG | WEIGHT: 195 LBS | SYSTOLIC BLOOD PRESSURE: 122 MMHG

## 2023-10-26 DIAGNOSIS — N89.8 VAGINAL ITCHING: Primary | ICD-10-CM

## 2023-10-26 DIAGNOSIS — N89.8 VAGINAL IRRITATION: ICD-10-CM

## 2023-10-26 PROCEDURE — 99214 OFFICE O/P EST MOD 30 MIN: CPT | Performed by: OBSTETRICS & GYNECOLOGY

## 2023-10-26 PROCEDURE — 87205 SMEAR GRAM STAIN: CPT

## 2023-10-26 PROCEDURE — 3074F SYST BP LT 130 MM HG: CPT | Performed by: OBSTETRICS & GYNECOLOGY

## 2023-10-26 PROCEDURE — 1036F TOBACCO NON-USER: CPT | Performed by: OBSTETRICS & GYNECOLOGY

## 2023-10-26 PROCEDURE — 3008F BODY MASS INDEX DOCD: CPT | Performed by: OBSTETRICS & GYNECOLOGY

## 2023-10-26 PROCEDURE — 3078F DIAST BP <80 MM HG: CPT | Performed by: OBSTETRICS & GYNECOLOGY

## 2023-10-26 PROCEDURE — 87800 DETECT AGNT MULT DNA DIREC: CPT

## 2023-10-26 ASSESSMENT — ENCOUNTER SYMPTOMS
ANOREXIA: 0
VOMITING: 0
CHILLS: 0
FREQUENCY: 0
ABDOMINAL PAIN: 0
FLANK PAIN: 0
NAUSEA: 0
BACK PAIN: 0
HEADACHES: 0
FEVER: 0
DIARRHEA: 0
DYSURIA: 0
SORE THROAT: 0
CONSTIPATION: 0
HEMATURIA: 0

## 2023-10-26 NOTE — PATIENT INSTRUCTIONS
Thanks for coming in today for follow-up.    Cultures were sent.  Results should be available in the next 24 to 48 hours.  You may call the office and select option #2 to speak with the nurse to obtain the results.    Review the information about vulvovaginal care.    Return the office for your annual GYN exam or as needed.    Feel free to call the office with any problems, questions or concerns prior to your next scheduled visit.

## 2023-10-26 NOTE — PROGRESS NOTES
43-year-old -1-2-1 -American woman presents with a 1 week history of vulvovaginal irritation.  She denies any vaginal discharge, odor, change in products, pelvic pain or recent antibiotic use.  Does wax frequently, but states that this is not related to her waxing symptoms.      She reports a new diagnosis of HSV in 2023.    GynHx: Menarche began at age 12.  She has been postmenopausal since 2022.  She had a hysterectomy in  for fibroids.  She has a history of HSV.  She denies any abnormal Pap smears or sexual abuse.  She is sexually active with 1 male partner.    OBHx: Emergent  at 26 weeks. EAB x1. SAB x1.     WDWN woman in NAD   Abdomen - soft NT, ND   Vulva: Bartholin's, Urethra, Hetland's normal   Vagina: normal mucosa   Cervix: absent   Uterus: absent   Adnexa: no mass, fullness, tenderness     Vulvar irritation     -  BV swab     -  GC and CT sent     -  Vulvar care info     -  Lube samples given

## 2023-10-27 LAB
C TRACH RRNA SPEC QL NAA+PROBE: NEGATIVE
CLUE CELLS VAG LPF-#/AREA: NORMAL /[LPF]
N GONORRHOEA DNA SPEC QL PROBE+SIG AMP: NEGATIVE
NUGENT SCORE: 1
YEAST VAG WET PREP-#/AREA: NORMAL

## 2023-11-01 ENCOUNTER — SPECIALTY PHARMACY (OUTPATIENT)
Dept: PHARMACY | Facility: CLINIC | Age: 43
End: 2023-11-01

## 2023-11-02 ENCOUNTER — APPOINTMENT (OUTPATIENT)
Dept: PRIMARY CARE | Facility: CLINIC | Age: 43
End: 2023-11-02
Payer: COMMERCIAL

## 2023-11-03 DIAGNOSIS — N95.2 VAGINAL ATROPHY: Primary | ICD-10-CM

## 2023-11-03 RX ORDER — ESTRADIOL 0.1 MG/G
CREAM VAGINAL
Qty: 42.5 G | Refills: 2 | Status: SHIPPED | OUTPATIENT
Start: 2023-11-03

## 2023-11-27 ENCOUNTER — OFFICE VISIT (OUTPATIENT)
Dept: PULMONOLOGY | Facility: HOSPITAL | Age: 43
End: 2023-11-27
Payer: COMMERCIAL

## 2023-11-27 VITALS
TEMPERATURE: 97 F | SYSTOLIC BLOOD PRESSURE: 127 MMHG | WEIGHT: 199 LBS | OXYGEN SATURATION: 97 % | HEART RATE: 76 BPM | DIASTOLIC BLOOD PRESSURE: 89 MMHG | BODY MASS INDEX: 30.71 KG/M2

## 2023-11-27 DIAGNOSIS — J45.909 MILD ASTHMA, UNSPECIFIED WHETHER COMPLICATED, UNSPECIFIED WHETHER PERSISTENT (HHS-HCC): Primary | ICD-10-CM

## 2023-11-27 DIAGNOSIS — J30.9 ALLERGIC RHINITIS, UNSPECIFIED SEASONALITY, UNSPECIFIED TRIGGER: ICD-10-CM

## 2023-11-27 PROCEDURE — 3074F SYST BP LT 130 MM HG: CPT | Performed by: NURSE PRACTITIONER

## 2023-11-27 PROCEDURE — 99204 OFFICE O/P NEW MOD 45 MIN: CPT | Performed by: NURSE PRACTITIONER

## 2023-11-27 PROCEDURE — 99214 OFFICE O/P EST MOD 30 MIN: CPT | Performed by: NURSE PRACTITIONER

## 2023-11-27 PROCEDURE — 3008F BODY MASS INDEX DOCD: CPT | Performed by: NURSE PRACTITIONER

## 2023-11-27 PROCEDURE — 3079F DIAST BP 80-89 MM HG: CPT | Performed by: NURSE PRACTITIONER

## 2023-11-27 PROCEDURE — 1036F TOBACCO NON-USER: CPT | Performed by: NURSE PRACTITIONER

## 2023-11-27 RX ORDER — ALBUTEROL SULFATE 0.83 MG/ML
2.5 SOLUTION RESPIRATORY (INHALATION) 4 TIMES DAILY PRN
Qty: 90 ML | Refills: 2 | Status: SHIPPED | OUTPATIENT
Start: 2023-11-27 | End: 2024-11-26

## 2023-11-27 RX ORDER — CETIRIZINE HYDROCHLORIDE 10 MG/1
10 TABLET ORAL DAILY
Qty: 30 TABLET | Refills: 3 | Status: SHIPPED | OUTPATIENT
Start: 2023-11-27

## 2023-11-27 RX ORDER — MOMETASONE FUROATE AND FORMOTEROL FUMARATE DIHYDRATE 100; 5 UG/1; UG/1
2 AEROSOL RESPIRATORY (INHALATION) 2 TIMES DAILY
Qty: 53 G | Refills: 3 | Status: SHIPPED | OUTPATIENT
Start: 2023-11-27 | End: 2024-11-26

## 2023-11-27 SDOH — ECONOMIC STABILITY: FOOD INSECURITY: WITHIN THE PAST 12 MONTHS, THE FOOD YOU BOUGHT JUST DIDN'T LAST AND YOU DIDN'T HAVE MONEY TO GET MORE.: NEVER TRUE

## 2023-11-27 SDOH — ECONOMIC STABILITY: FOOD INSECURITY: WITHIN THE PAST 12 MONTHS, YOU WORRIED THAT YOUR FOOD WOULD RUN OUT BEFORE YOU GOT MONEY TO BUY MORE.: NEVER TRUE

## 2023-11-27 ASSESSMENT — ENCOUNTER SYMPTOMS
PALPITATIONS: 0
JOINT SWELLING: 0
RHINORRHEA: 0
VOICE CHANGE: 0
MYALGIAS: 0
NUMBNESS: 0
ABDOMINAL PAIN: 0
WEAKNESS: 0
FATIGUE: 0
BACK PAIN: 0
NERVOUS/ANXIOUS: 0
EYE PAIN: 0
DIARRHEA: 0
HEADACHES: 0
DIZZINESS: 0
SINUS PRESSURE: 0
NAUSEA: 0
VOMITING: 0
FEVER: 0
ARTHRALGIAS: 0
AGITATION: 0

## 2023-11-27 ASSESSMENT — LIFESTYLE VARIABLES: HOW OFTEN DO YOU HAVE A DRINK CONTAINING ALCOHOL: MONTHLY OR LESS

## 2023-11-27 ASSESSMENT — PATIENT HEALTH QUESTIONNAIRE - PHQ9
SUM OF ALL RESPONSES TO PHQ9 QUESTIONS 1 & 2: 0
1. LITTLE INTEREST OR PLEASURE IN DOING THINGS: NOT AT ALL
2. FEELING DOWN, DEPRESSED OR HOPELESS: NOT AT ALL

## 2023-11-27 ASSESSMENT — PAIN SCALES - GENERAL: PAINLEVEL: 0-NO PAIN

## 2023-11-27 NOTE — PROGRESS NOTES
Patient: Kriss Jhaveri    [id number]  : 1980 -- AGE 43 y.o.    Provider: NIKOS Estes-CNP     Location [facility]   Service Date: 2023              [facility]  New Visit Note      HISTORY OF PRESENT ILLNESS     The patient's referring provider is: Chapis Olivo DO    HISTORY OF PRESENT ILLNESS   Kriss Jhaveri is a 43 y.o. female who presents to a [facility] for an evaluation with concerns of Cough (Pt. Here today for NPV). I have independently interviewed and examined the patient in the office and reviewed available records.    Current History  She states she feels her asthma only acts up when she is catching a cold. She states she was doing well on dulera. She states she has not had it for a while. She states at times her airway cuts off - at times she will have pnaic attacks with when her asthma was acting up. She states in the end of  she had back pain with COVID -- states ever since then when she gets sick her cough is deep/ barky.  She was tried on advair - kept getting thrush. She tried breo and it caused a rash around her mouth with some tingling. She states her cough when she is sick is really -- has a recording today from 11//15 - after being outside and doing yardwork. Its a dry barky cough. She was told to start taking cetrizine  when she had an asthma/ panic attack in 2023 and she feel this has been helpful. She states her cough is doing better now. She states she has not noticed wheezing since that day out doing yardwork. She has an albuterol inhaler - she states she gets fine little bumps around her mouth. She states she is going through menopause - so feels things are out of sorts. She states her albuteorl has not always given her bumps around her mouth. She states the rash with the breo was different - she got a medrol dose pack and a triamcinolone cream and it went  away. She denies any DISLA or SOB at rest -- states she will if she feels she is coming down with something.  She is still taking the cetrizine daily and has found it helpful.   She does have GERD symptoms - can be daily if she eats something before bed. She has made lifestyle modifications with control of her symptoms. She denies any chest pain, but she did have significant chest tightness during ED visit in 9/2023.     ACT today 25     Previous pulmonary history:  She was previously told she has asthma.     Inhalers/nebulized medications: albuterol     Hospitalization History: She has not been hospitalized over the last year for breathing related problem. Anaphylaxis from delsym - admitted and diagnosed with asthma at that time.     Sleep history: She has been told she snores. She denies witnessed apneas. She wakes up feeling rested -- does have a dry mouth.       ALLERGIES AND MEDICATIONS     ALLERGIES  Allergies   Allergen Reactions    Delsym Anaphylaxis and Unknown    Breo Ellipta [Fluticasone Furoate-Vilanterol] Swelling       MEDICATIONS  Current Outpatient Medications   Medication Sig Dispense Refill    albuterol 90 mcg/actuation inhaler Inhale.      crisaborole (Eucrisa) 2 % ointment Eucrisa 2 % External Ointment  Refills: 0      hyaluronan (Hymovis) 24 mg/3 mL injection Inject into the joint.      multivitamin tablet Take 1 tablet by mouth once daily.      BACILLUS COAGULANS-INULIN ORAL Take 1 capsule by mouth once daily.      clotrimazole-betamethasone (Lotrisone) cream Apply 1 Film topically 2 times a day.      estradiol (Estrace) 0.01 % (0.1 mg/gram) vaginal cream Discard the applicator and apply a pea sized amount to the vaginal opening and just inside the vagina daily for 14 days followed by 3 times/week (Patient not taking: Reported on 11/27/2023) 42.5 g 2    valACYclovir (Valtrex) 500 mg tablet Take by mouth.       No current facility-administered medications for this visit.         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     PAST SURGICAL HISTORY  Past Surgical History:   Procedure Laterality Date     SECTION, CLASSIC  2018     Section    OTHER SURGICAL HISTORY  2013    General Surgery    OTHER SURGICAL HISTORY  2013    Biopsy Renal    OTHER SURGICAL HISTORY  2015    Hysterectomy Robotic-Assisted    OTHER SURGICAL HISTORY  2015    Laparoscopy Myomectomy    TYMPANOSTOMY TUBE PLACEMENT  2013    Ear Pressure Equalization Tube       IMMUNIZATION HISTORY  Immunization History   Administered Date(s) Administered    PPD Test 10/30/2012, 2012    Pfizer Purple Cap SARS-CoV-2 2021, 2021, 2022       SOCIAL HISTORY  Smoking: never   Alcohol: special occasions   Illicit drugs:  none     OCCUPATIONAL/ENVIRONMENTAL HISTORY  Currently works as an RN. Works for [employer] as a      FAMILY HISTORY  Dad - bilateral Pes , . DVT    Paternal grandmother - blood clots   Sarcoidosis - Dad   Son - Asthma - micropreemie     RESULTS/DATA     Pulmonary Function Test Results      None on record     Chest Radiograph     XR chest 2 view 2023- Impression  No acute cardiopulmonary process is evident.      Chest CT Scan     CT angio chest for pulmonary embolism 2023  No evidence of acute pulmonary embolism.  No evidence of pneumonia.      Echocardiogram     No results found for this or any previous visit from the past 365 days.      Other testing/ Labs      REVIEW OF SYSTEMS     REVIEW OF SYSTEMS  Review of Systems   Constitutional:  Negative for fatigue and fever.   HENT:  Negative for congestion, postnasal drip, rhinorrhea, sinus pressure and voice change.    Eyes:  Negative for pain and visual disturbance.   Cardiovascular:  Negative for chest pain, palpitations and leg swelling.   Gastrointestinal:  Negative for abdominal pain, diarrhea,  nausea and vomiting.   Endocrine: Negative for cold intolerance and heat intolerance.   Musculoskeletal:  Negative for arthralgias, back pain, joint swelling and myalgias.   Skin:  Negative for rash.   Neurological:  Negative for dizziness, weakness, numbness and headaches.   Psychiatric/Behavioral:  Negative for agitation. The patient is not nervous/anxious.          PHYSICAL EXAM     VITAL SIGNS: /89   Pulse 76   Temp 36.1 °C (97 °F)   Wt 90.3 kg (199 lb)   SpO2 97% Comment: RA  BMI 30.71 kg/m²      CURRENT WEIGHT: [unfilled]  BMI: [unfilled]  PREVIOUS WEIGHTS:  Wt Readings from Last 3 Encounters:   11/27/23 90.3 kg (199 lb)   10/26/23 88.5 kg (195 lb)   10/10/23 89.8 kg (198 lb)       Physical Exam  Vitals reviewed.   Constitutional:       General: She is not in acute distress.     Appearance: Normal appearance. She is not ill-appearing or toxic-appearing.   HENT:      Head: Normocephalic.      Nose: No rhinorrhea.   Cardiovascular:      Rate and Rhythm: Normal rate and regular rhythm.      Heart sounds: Normal heart sounds.   Pulmonary:      Effort: Pulmonary effort is normal. No respiratory distress.      Breath sounds: Normal breath sounds. No stridor.      Comments: Slightly coarse   Abdominal:      General: Abdomen is flat.   Musculoskeletal:         General: No swelling. Normal range of motion.   Skin:     General: Skin is warm and dry.      Nails: There is no clubbing.   Neurological:      General: No focal deficit present.      Mental Status: She is alert.   Psychiatric:         Mood and Affect: Mood normal.         Behavior: Behavior normal.         Judgment: Judgment normal.         ASSESSMENT/PLAN       Asthma:   - will get PFTs -- full pre post and FENO with next appt   - restart dulera - 100 1 puffs twice daily - rinse mouth out afterwards   - continue albuterol HFA inhaler 2 puffs or albuterol nebulizer treatment every 4-6 hours as needed for shortness of breath    - will order you a nebulizer  machine     2. Allergic rhinitis:   - continue cetrizine (zyrtec) 10mg daily at bedtime   - continue fluticasone (flonase) 1-2 sprays per day as needed   - use nasal saline 2-3  per day as needed

## 2023-11-27 NOTE — PATIENT INSTRUCTIONS
Asthma:   - will get PFTs -- full pre post and FENO with next appt   - restart dulera - 100 1 puffs twice daily - rinse mouth out afterwards   - continue albuterol HFA inhaler 2 puffs or albuterol nebulizer treatment every 4-6 hours as needed for shortness of breath    - will order you a nebulizer machine     2. Allergic rhinitis:   - continue cetrizine (zyrtec) 10mg daily at bedtime   - continue fluticasone (flonase) 1-2 sprays per day as needed   - use nasal saline 2-3  per day as needed       Thank you for visiting the Pulmonary clinic today!   Return to clinic 2 months with breathing tests  or sooner if needed   Glory Fisher CNP  My office number is [phone]  Cici is my  and Sakina is my nurse.   Radiology scheduling [phone]   Appointment scheduling [phone]

## 2023-12-05 RX ORDER — SEMAGLUTIDE 0.5 MG/.5ML
0.5 INJECTION, SOLUTION SUBCUTANEOUS
Qty: 6 ML | Refills: 1 | Status: SHIPPED | OUTPATIENT
Start: 2023-12-05 | End: 2023-12-14 | Stop reason: SDUPTHER

## 2023-12-06 ENCOUNTER — SPECIALTY PHARMACY (OUTPATIENT)
Dept: PHARMACY | Facility: CLINIC | Age: 43
End: 2023-12-06

## 2023-12-14 RX ORDER — SEMAGLUTIDE 0.5 MG/.5ML
0.5 INJECTION, SOLUTION SUBCUTANEOUS
Qty: 6 ML | Refills: 1 | Status: SHIPPED | OUTPATIENT
Start: 2023-12-14 | End: 2024-06-11

## 2024-01-04 ENCOUNTER — ALLIED HEALTH (OUTPATIENT)
Dept: INTEGRATIVE MEDICINE | Facility: CLINIC | Age: 44
End: 2024-01-04
Payer: COMMERCIAL

## 2024-01-04 DIAGNOSIS — M79.18 MYALGIA, OTHER SITE: ICD-10-CM

## 2024-01-04 DIAGNOSIS — M99.02 SEGMENTAL AND SOMATIC DYSFUNCTION OF THORACIC REGION: Primary | ICD-10-CM

## 2024-01-04 DIAGNOSIS — M54.6 THORACIC SPINE PAIN: ICD-10-CM

## 2024-01-04 PROCEDURE — 97112 NEUROMUSCULAR REEDUCATION: CPT | Performed by: CHIROPRACTOR

## 2024-01-04 PROCEDURE — 98940 CHIROPRACT MANJ 1-2 REGIONS: CPT | Performed by: CHIROPRACTOR

## 2024-01-04 NOTE — PROGRESS NOTES
Subjective   Patient ID: Kriss Jhaveri is a 43 y.o. female who presents January 4, 2024 for chiropractic care.    VPCY    Today, the patient rates their degree of pain as a 4 out of 10 on the numeric pain rating scale.     HPI : Kriss presents to my office for chiropractic care following our last visit on September 11, 2023. Kriss reports main complaint of mid back pain, slightly more prominent on the left. Reports that in November she used a corset brace along the lower back for a few consecutive days but noticed onset of mid back discomfort after this time. Also reports having COVID since the time of our last visit and notes feeling discomfort occurring in her spine during this time.  Feels something in her spine is 'off' as she has been experiencing discomfort along the BL (L>R) mid back region and reports intermittent tingling localized on the spine. She also states that she started a liver cleanse in early December that she has continued and she has experienced significant improvements in body and mind throughout this process and over the past month. Notes she has not been working out recently and stopped working with her  in the fall due to feeling poorly during and after workouts. Denies other changes to health.       Objective   Physical Exam  Musculoskeletal:        Back:    Neurological:      General: No focal deficit present.      Mental Status: She is alert and oriented to person, place, and time.      Cranial Nerves: No dysarthria or facial asymmetry.      Sensory: Sensation is intact.      Motor: Motor function is intact.      Coordination: Coordination is intact.      Gait: Gait is intact. Gait normal.         Palpation of the following region(s) revealed:    Thoracic: Thoracic paraspinals left, hypertonicity and tenderness.  Middle trapezius left, hypertonicity and tenderness.  Rhomboids left, hypertonicity and tenderness.  Intercostal left, hypertonicity and  tenderness.          Segmental Joint(s): Segmental joint dysfunction was assessed with motion palpation and is identified in the following areas:  Thoracic : T3, T5, T6, T7, and T10      Assessment/Plan   Today's Treatment Included: Chiropractic manipulation to the   Segmental Joint(s) Thoracic : T3, T5, T6, T7, and T9   Treatment Techniques Used : Activator/Tool assisted technique and Low force  Neuromuscular Re-Education (44817): Start time: 9:40 am End time: 9:55 am  1 Units  Integrative Dry Needling (IDN) - Needles in / out:  3.  IDN: L T5-7 PS    Seated ART, IASTM and cupping was performed in the following areas:     Thoracic Paraspinal mm. left, Middle Trapezius left, Rhomboids left, Latissimus Dorsi left, and Intercostal left              RockTape to thoracic PS BL    Referred to Dr. Ellison for manual adjustment and F/U as I approach maternity leave    The patient tolerated today's treatment with little or no additional discomfort and was instructed to contact the office for questions or concerns.

## 2024-01-05 ENCOUNTER — OFFICE VISIT (OUTPATIENT)
Dept: OBSTETRICS AND GYNECOLOGY | Facility: CLINIC | Age: 44
End: 2024-01-05
Payer: COMMERCIAL

## 2024-01-05 VITALS — SYSTOLIC BLOOD PRESSURE: 134 MMHG | HEIGHT: 67 IN | BODY MASS INDEX: 31.17 KG/M2 | DIASTOLIC BLOOD PRESSURE: 82 MMHG

## 2024-01-05 DIAGNOSIS — N95.1 MENOPAUSAL SYNDROME ON HORMONE REPLACEMENT THERAPY: Primary | ICD-10-CM

## 2024-01-05 DIAGNOSIS — Z79.890 MENOPAUSAL SYNDROME ON HORMONE REPLACEMENT THERAPY: Primary | ICD-10-CM

## 2024-01-05 PROCEDURE — 3075F SYST BP GE 130 - 139MM HG: CPT | Performed by: NURSE PRACTITIONER

## 2024-01-05 PROCEDURE — 1036F TOBACCO NON-USER: CPT | Performed by: NURSE PRACTITIONER

## 2024-01-05 PROCEDURE — 3079F DIAST BP 80-89 MM HG: CPT | Performed by: NURSE PRACTITIONER

## 2024-01-05 PROCEDURE — 99214 OFFICE O/P EST MOD 30 MIN: CPT | Performed by: NURSE PRACTITIONER

## 2024-01-05 PROCEDURE — 3008F BODY MASS INDEX DOCD: CPT | Performed by: NURSE PRACTITIONER

## 2024-01-05 RX ORDER — ESTRADIOL 0.03 MG/D
1 PATCH TRANSDERMAL
Qty: 4 PATCH | Refills: 3 | Status: SHIPPED | OUTPATIENT
Start: 2024-01-05 | End: 2024-02-13 | Stop reason: ALTCHOICE

## 2024-01-05 ASSESSMENT — ENCOUNTER SYMPTOMS
MUSCULOSKELETAL NEGATIVE: 0
CONSTITUTIONAL NEGATIVE: 0
EYES NEGATIVE: 0
HEMATOLOGIC/LYMPHATIC NEGATIVE: 0
PSYCHIATRIC NEGATIVE: 0
NEUROLOGICAL NEGATIVE: 0
ALLERGIC/IMMUNOLOGIC NEGATIVE: 0
GASTROINTESTINAL NEGATIVE: 0
RESPIRATORY NEGATIVE: 0
ENDOCRINE NEGATIVE: 0
CARDIOVASCULAR NEGATIVE: 0

## 2024-01-05 ASSESSMENT — PAIN SCALES - GENERAL: PAINLEVEL: 0-NO PAIN

## 2024-01-05 NOTE — PROGRESS NOTES
Subjective   Patient ID: Kriss Jhaveri is a 43 y.o. female who presents for Menopause.  HPI  Concerns: likes to use imvexxy and occasional estrogen cream to the vulva      Menopausal age: hysterectomy    Any Contraindications to HT: personal h/o breast cancer, estrogen sensitive cancer, dementia, stroke, MI, VTE or inherited high risk for VTE, SCAD: HTN that is diet controlled  h/o congenital heart disease (increased risk of DVT) none  Father h/o PE and DVT    HT: stopped the estradiol patch d/t side effects    VMS: yes  Sleep difficulties: none  Mood changes: none  Joint pain: none  Brain fog/difficulty concentrating: yes    GSM: yes, treated with Imvexxy  are you sexually active: yes  dyspareunia: yes  decrease in desire: none  difficulty reaching orgasm:  none  Urinary Incontinence: yes       Exercise:  none  weight bearing:      Review of Systems    Objective   Physical Exam    Assessment/Plan   Diagnoses and all orders for this visit:  Menopausal syndrome on hormone replacement therapy  -     estradiol (Climara) 0.025 mg/24 hr patch; Place 1 patch over 7 days on the skin 1 (one) time per week.       Decision to restart estradiol at a low dose, transdermal d/t FH of DVT/PE  Information on [employer]   Pt to contact me with an update in 4-6 weeks    NIKOS Crawley-CNP 01/05/24 2:50 PM

## 2024-01-19 ENCOUNTER — HOSPITAL ENCOUNTER (OUTPATIENT)
Dept: RESPIRATORY THERAPY | Facility: HOSPITAL | Age: 44
End: 2024-01-19
Payer: COMMERCIAL

## 2024-01-30 ENCOUNTER — APPOINTMENT (OUTPATIENT)
Dept: INTEGRATIVE MEDICINE | Facility: CLINIC | Age: 44
End: 2024-01-30
Payer: COMMERCIAL

## 2024-02-01 ENCOUNTER — APPOINTMENT (OUTPATIENT)
Dept: INTEGRATIVE MEDICINE | Facility: CLINIC | Age: 44
End: 2024-02-01
Payer: COMMERCIAL

## 2024-02-13 DIAGNOSIS — N95.1 HOT FLASHES DUE TO MENOPAUSE: Primary | ICD-10-CM

## 2024-02-13 RX ORDER — ESTRADIOL 0.04 MG/D
1 PATCH TRANSDERMAL
Qty: 12 PATCH | Refills: 3 | Status: SHIPPED | OUTPATIENT
Start: 2024-02-13 | End: 2025-02-12

## 2024-02-13 NOTE — PROGRESS NOTES
Pt contacted me, not getting relief of her symptoms on 0.025mg estradiol patch; would like to increase dose. Prescription changed to 0.0375mg

## 2024-02-14 ENCOUNTER — SPECIALTY PHARMACY (OUTPATIENT)
Dept: PHARMACY | Facility: CLINIC | Age: 44
End: 2024-02-14

## 2024-02-14 ENCOUNTER — ALLIED HEALTH (OUTPATIENT)
Dept: INTEGRATIVE MEDICINE | Facility: CLINIC | Age: 44
End: 2024-02-14
Payer: COMMERCIAL

## 2024-02-14 DIAGNOSIS — M79.18 MYALGIA, OTHER SITE: ICD-10-CM

## 2024-02-14 DIAGNOSIS — M54.6 THORACIC SPINE PAIN: ICD-10-CM

## 2024-02-14 DIAGNOSIS — M99.04 SEGMENTAL AND SOMATIC DYSFUNCTION OF SACRAL REGION: ICD-10-CM

## 2024-02-14 DIAGNOSIS — M99.03 SEGMENTAL AND SOMATIC DYSFUNCTION OF LUMBAR REGION: ICD-10-CM

## 2024-02-14 DIAGNOSIS — M99.02 SEGMENTAL AND SOMATIC DYSFUNCTION OF THORACIC REGION: Primary | ICD-10-CM

## 2024-02-14 PROCEDURE — 97112 NEUROMUSCULAR REEDUCATION: CPT | Performed by: CHIROPRACTOR

## 2024-02-14 PROCEDURE — 98941 CHIROPRACT MANJ 3-4 REGIONS: CPT | Performed by: CHIROPRACTOR

## 2024-02-14 NOTE — PROGRESS NOTES
Subjective   Patient ID: Kriss Jhaveri is a 43 y.o. female who presents February 14, 2024 for chiropractic care.    (2/26) VPCY    Today, the patient rates their degree of pain as a 4 out of 10 on the numeric pain rating scale.     Kriss returns for continued chiropractic care. She states that she responded well to Dr. Velarde's treatment at her last visit. However, she presents with continued mid back pain and low back pain. She denies any new presentation of symptoms. The patient denies any changes in health since her last encounter and will follow up as scheduled.  _______________________________________  FU - 1/4/2024 (EM)  Kriss presents to my office for chiropractic care following our last visit on September 11, 2023. Kriss reports main complaint of mid back pain, slightly more prominent on the left. Reports that in November she used a corset brace along the lower back for a few consecutive days but noticed onset of mid back discomfort after this time. Also reports having COVID since the time of our last visit and notes feeling discomfort occurring in her spine during this time.  Feels something in her spine is 'off' as she has been experiencing discomfort along the BL (L>R) mid back region and reports intermittent tingling localized on the spine. She also states that she started a liver cleanse in early December that she has continued and she has experienced significant improvements in body and mind throughout this process and over the past month. Notes she has not been working out recently and stopped working with her  in the fall due to feeling poorly during and after workouts. Denies other changes to health.       Objective   Physical Exam  Neurological:      General: No focal deficit present.      Mental Status: She is alert and oriented to person, place, and time.      Cranial Nerves: No dysarthria or facial asymmetry.      Sensory: Sensation is intact.      Motor: Motor function is intact.       Coordination: Coordination is intact.      Gait: Gait is intact.       Palpation of the following region(s) revealed:    Thoracic: Thoracic paraspinals bilateral, hypertonicity and tenderness.  Middle trapezius bilateral, hypertonicity and tenderness.  Rhomboids bilateral, hypertonicity and tenderness.  Lumbar: Lumbar paraspinals bilateral, muscular hypertonicity.  Quadratus lumborum bilateral, muscular hypertonicity.        Segmental Joint(s): Segmental joint dysfunction was assessed with motion palpation and is identified in the following areas:  Thoracic : T4, T5, T6, and T10  Lumbopelvic / Sacral SIJ : L5/S1, R SIJ, and L SIJ    Assessment/Plan   Today's Treatment Included: Chiropractic manipulation to the  Segmental Joint(s) Lumbopelvic/Sacral SIJ : L1, L5/S1, R SIJ, and L SIJ Segmental Joint(s) Thoracic : T4, T5, T6, and T10   Treatment Techniques Used : Diversified CMT, Activator/Tool assisted technique, and Low force  Neuromuscular Re-Education (64550): Start time: 3:30 pm End time: 4:00 pm  2 Units  Pin and stretch  Integrative Dry Needling (IDN) - Needles in / out:  12.    Soft-tissue mobilization was performed in the following areas:     Thoracic Paraspinal mm. bilateral, Middle Trapezius bilateral, and Rhomboids bilateral  Lumbar Paraspinal mm. bilateral and Quadratus Lumborum bilateral            The patient tolerated today's treatment with little or no additional discomfort and was instructed to contact the office for questions or concerns.

## 2024-02-15 ENCOUNTER — HOSPITAL ENCOUNTER (OUTPATIENT)
Dept: RESPIRATORY THERAPY | Facility: HOSPITAL | Age: 44
Discharge: HOME | End: 2024-02-15
Payer: COMMERCIAL

## 2024-02-15 DIAGNOSIS — J45.909 MILD ASTHMA, UNSPECIFIED WHETHER COMPLICATED, UNSPECIFIED WHETHER PERSISTENT (HHS-HCC): ICD-10-CM

## 2024-02-15 PROCEDURE — 94726 PLETHYSMOGRAPHY LUNG VOLUMES: CPT | Performed by: INTERNAL MEDICINE

## 2024-02-15 PROCEDURE — 94726 PLETHYSMOGRAPHY LUNG VOLUMES: CPT

## 2024-02-15 PROCEDURE — 94060 EVALUATION OF WHEEZING: CPT | Performed by: INTERNAL MEDICINE

## 2024-02-15 PROCEDURE — 94729 DIFFUSING CAPACITY: CPT | Performed by: INTERNAL MEDICINE

## 2024-02-22 ENCOUNTER — OFFICE VISIT (OUTPATIENT)
Dept: PRIMARY CARE | Facility: CLINIC | Age: 44
End: 2024-02-22
Payer: COMMERCIAL

## 2024-02-22 VITALS
HEIGHT: 67 IN | DIASTOLIC BLOOD PRESSURE: 88 MMHG | BODY MASS INDEX: 31.23 KG/M2 | SYSTOLIC BLOOD PRESSURE: 122 MMHG | HEART RATE: 72 BPM | RESPIRATION RATE: 16 BRPM | OXYGEN SATURATION: 98 % | WEIGHT: 199 LBS

## 2024-02-22 DIAGNOSIS — J45.909 MILD ASTHMA, UNSPECIFIED WHETHER COMPLICATED, UNSPECIFIED WHETHER PERSISTENT (HHS-HCC): Primary | ICD-10-CM

## 2024-02-22 PROCEDURE — 3008F BODY MASS INDEX DOCD: CPT | Performed by: STUDENT IN AN ORGANIZED HEALTH CARE EDUCATION/TRAINING PROGRAM

## 2024-02-22 PROCEDURE — 1036F TOBACCO NON-USER: CPT | Performed by: STUDENT IN AN ORGANIZED HEALTH CARE EDUCATION/TRAINING PROGRAM

## 2024-02-22 PROCEDURE — 3079F DIAST BP 80-89 MM HG: CPT | Performed by: STUDENT IN AN ORGANIZED HEALTH CARE EDUCATION/TRAINING PROGRAM

## 2024-02-22 PROCEDURE — 99213 OFFICE O/P EST LOW 20 MIN: CPT | Performed by: STUDENT IN AN ORGANIZED HEALTH CARE EDUCATION/TRAINING PROGRAM

## 2024-02-22 PROCEDURE — 3074F SYST BP LT 130 MM HG: CPT | Performed by: STUDENT IN AN ORGANIZED HEALTH CARE EDUCATION/TRAINING PROGRAM

## 2024-02-22 RX ORDER — LEVALBUTEROL TARTRATE 45 UG/1
1-2 AEROSOL, METERED ORAL EVERY 6 HOURS PRN
Qty: 15 G | Refills: 11 | Status: SHIPPED | OUTPATIENT
Start: 2024-02-22 | End: 2024-02-22 | Stop reason: WASHOUT

## 2024-02-22 ASSESSMENT — PATIENT HEALTH QUESTIONNAIRE - PHQ9
SUM OF ALL RESPONSES TO PHQ9 QUESTIONS 1 AND 2: 0
1. LITTLE INTEREST OR PLEASURE IN DOING THINGS: NOT AT ALL
2. FEELING DOWN, DEPRESSED OR HOPELESS: NOT AT ALL

## 2024-02-22 NOTE — PROGRESS NOTES
Subjective   Patient ID: Kriss Jhaveri is a 43 y.o. female who presents for Follow-up (Pt is here for a 6 month follow up.).    Working out twice a   Weight regain   Liver cleanse: cleanse to heal: decreased flares her hsv  Doing well otherwise     Asthma   Albuterol     Menopausal symptoms have subsided with the liver cleanse  Dulera was working well    [place] cherry-immunity booster   Stoner breaker p      Objective   Physical Exam  Vitals reviewed.   Constitutional:       Appearance: Normal appearance.   Cardiovascular:      Rate and Rhythm: Normal rate and regular rhythm.      Heart sounds: No murmur heard.  Pulmonary:      Effort: Pulmonary effort is normal. No respiratory distress.      Breath sounds: Normal breath sounds. No wheezing.   Musculoskeletal:      Cervical back: Neck supple.      Left lower leg: No edema.   Neurological:      Mental Status: She is alert.         Assessment/Plan     BMI 31.17  Patient working on lifestyle choices including working out ice twice a week  Taking supplements for a liver cleanse and immunity boosters  Wegovy was approved however obtaining medication has been of concern and supply has not been stocked at her local pharmacy    Asthma  Moderate control  Insurance would not cover her Dulera  We will trial Jeovany Olivo DO 02/22/24 10:50 AM

## 2024-03-12 ENCOUNTER — SPECIALTY PHARMACY (OUTPATIENT)
Dept: PHARMACY | Facility: CLINIC | Age: 44
End: 2024-03-12

## 2024-03-12 PROCEDURE — RXMED WILLOW AMBULATORY MEDICATION CHARGE

## 2024-03-14 ENCOUNTER — PHARMACY VISIT (OUTPATIENT)
Dept: PHARMACY | Facility: CLINIC | Age: 44
End: 2024-03-14
Payer: MEDICARE

## 2024-03-19 ENCOUNTER — TELEPHONE (OUTPATIENT)
Dept: OBSTETRICS AND GYNECOLOGY | Facility: CLINIC | Age: 44
End: 2024-03-19
Payer: COMMERCIAL

## 2024-03-19 DIAGNOSIS — A60.00 GENITAL HERPES SIMPLEX, UNSPECIFIED SITE: ICD-10-CM

## 2024-03-19 RX ORDER — VALACYCLOVIR HYDROCHLORIDE 500 MG/1
500 TABLET, FILM COATED ORAL DAILY
Qty: 90 TABLET | Refills: 2 | Status: SHIPPED | OUTPATIENT
Start: 2024-03-19 | End: 2024-05-20 | Stop reason: SDUPTHER

## 2024-03-19 NOTE — TELEPHONE ENCOUNTER
----- Message from Kriss Jhaveri sent at 3/19/2024 12:33 PM EDT -----  Regarding: Refill needed   Contact: [phone]  Aubrey Mathew,    I wanted to inquire about getting a refill for my Valacyclovir? I am out of refills at this time.     Thanks , Kriss

## 2024-03-19 NOTE — TELEPHONE ENCOUNTER
Contacted pt and verified name and .  Pt notified she hadn't been seen in over a year and advised to schedule annual exam.  Pt scheduled for 2024 with Leonardo Santiago.  Pt made aware that prescription refill has been sent over to [facility] as requested.  Pt advised to allow time for her review and sign orders.  Pt states understanding and denies having questions at this time.

## 2024-04-16 ENCOUNTER — OFFICE VISIT (OUTPATIENT)
Dept: ORTHOPEDIC SURGERY | Facility: HOSPITAL | Age: 44
End: 2024-04-16
Payer: COMMERCIAL

## 2024-04-16 ENCOUNTER — HOSPITAL ENCOUNTER (OUTPATIENT)
Dept: RADIOLOGY | Facility: HOSPITAL | Age: 44
Discharge: HOME | End: 2024-04-16
Payer: COMMERCIAL

## 2024-04-16 VITALS — BODY MASS INDEX: 28.34 KG/M2 | HEIGHT: 68 IN | WEIGHT: 187 LBS

## 2024-04-16 DIAGNOSIS — M79.605 PAIN OF LEFT LOWER EXTREMITY: ICD-10-CM

## 2024-04-16 DIAGNOSIS — M76.32 ILIOTIBIAL BAND SYNDROME OF LEFT SIDE: Primary | ICD-10-CM

## 2024-04-16 PROCEDURE — 73564 X-RAY EXAM KNEE 4 OR MORE: CPT | Mod: LEFT SIDE | Performed by: STUDENT IN AN ORGANIZED HEALTH CARE EDUCATION/TRAINING PROGRAM

## 2024-04-16 PROCEDURE — 73502 X-RAY EXAM HIP UNI 2-3 VIEWS: CPT | Mod: LT

## 2024-04-16 PROCEDURE — 99214 OFFICE O/P EST MOD 30 MIN: CPT | Performed by: STUDENT IN AN ORGANIZED HEALTH CARE EDUCATION/TRAINING PROGRAM

## 2024-04-16 PROCEDURE — 73502 X-RAY EXAM HIP UNI 2-3 VIEWS: CPT | Mod: LEFT SIDE | Performed by: STUDENT IN AN ORGANIZED HEALTH CARE EDUCATION/TRAINING PROGRAM

## 2024-04-16 PROCEDURE — 99214 OFFICE O/P EST MOD 30 MIN: CPT | Mod: GC | Performed by: STUDENT IN AN ORGANIZED HEALTH CARE EDUCATION/TRAINING PROGRAM

## 2024-04-16 PROCEDURE — 3008F BODY MASS INDEX DOCD: CPT | Performed by: STUDENT IN AN ORGANIZED HEALTH CARE EDUCATION/TRAINING PROGRAM

## 2024-04-16 PROCEDURE — 73564 X-RAY EXAM KNEE 4 OR MORE: CPT | Mod: LT

## 2024-04-16 PROCEDURE — 1036F TOBACCO NON-USER: CPT | Performed by: STUDENT IN AN ORGANIZED HEALTH CARE EDUCATION/TRAINING PROGRAM

## 2024-04-16 RX ORDER — CELECOXIB 100 MG/1
100 CAPSULE ORAL 2 TIMES DAILY
Qty: 30 CAPSULE | Refills: 0 | Status: SHIPPED | OUTPATIENT
Start: 2024-04-16 | End: 2024-05-01

## 2024-04-16 RX ORDER — CELECOXIB 100 MG/1
100 CAPSULE ORAL 2 TIMES DAILY
Qty: 28 CAPSULE | Refills: 0 | Status: SHIPPED | OUTPATIENT
Start: 2024-04-16 | End: 2024-04-16

## 2024-04-16 ASSESSMENT — PAIN - FUNCTIONAL ASSESSMENT: PAIN_FUNCTIONAL_ASSESSMENT: 0-10

## 2024-04-16 ASSESSMENT — PAIN SCALES - GENERAL: PAINLEVEL_OUTOF10: 4

## 2024-04-16 ASSESSMENT — PAIN DESCRIPTION - DESCRIPTORS: DESCRIPTORS: ACHING;THROBBING;SHARP;SHOOTING

## 2024-04-16 NOTE — PROGRESS NOTES
REFERRAL SOURCE: No ref. provider found     CHIEF COMPLAINT: left knee pain  - orthopedic injury clinic evaluation    HISTORY OF PRESENT ILLNESS  Kriss Jhaveri is a very pleasant 43 y.o. female with history of bilateral knee arthritis, HTN, HLD who is here for evaluation of left knee pain.     4/16/24: She has previously had steroid and synvic injections for her bilateral knee OA by Dr. Burns. She did a 5 mile incline/decline walk in the park yesterday morning. She developed pain later in the day. Pain is achy and from the knee up the lateral thigh. She had difficulty sleeping due to the pain. She took meloxicam and is eating inflammatory foods without relief.  She is also used ice without relief.  She states that this pain is different than her typical pain from knee arthritis.    MEDS    Current Outpatient Medications:     albuterol 2.5 mg /3 mL (0.083 %) nebulizer solution, Take 3 mL (2.5 mg) by nebulization 4 times a day as needed for wheezing or shortness of breath., Disp: 90 mL, Rfl: 2    albuterol 90 mcg/actuation inhaler, Inhale., Disp: , Rfl:     albuterol-budesonide (Airsupra) 90-80 mcg/actuation inhaler, Inhale 2 puffs every 6 hours if needed (wheezing and shortness of breathe)., Disp: 10.7 g, Rfl: 3    BACILLUS COAGULANS-INULIN ORAL, Take 1 capsule by mouth once daily., Disp: , Rfl:     cetirizine (ZyrTEC) 10 mg tablet, Take 1 tablet (10 mg) by mouth once daily. Take in the evening before bedtime, Disp: 30 tablet, Rfl: 3    clotrimazole-betamethasone (Lotrisone) cream, Apply 1 Film topically 2 times a day., Disp: , Rfl:     crisaborole (Eucrisa) 2 % ointment, Eucrisa 2 % External Ointment Refills: 0, Disp: , Rfl:     estradiol (Climara) 0.0375 mg/24 hr patch, Place 1 patch over 7 days on the skin 1 (one) time per week., Disp: 12 patch, Rfl: 3    estradiol (Estrace) 0.01 % (0.1 mg/gram) vaginal cream, Discard the applicator and apply a pea sized amount to the vaginal opening and just inside the  vagina daily for 14 days followed by 3 times/week, Disp: 42.5 g, Rfl: 2    hyaluronan (Hymovis) 24 mg/3 mL injection, Inject into the joint., Disp: , Rfl:     mometasone-formoterol (Dulera) 100-5 mcg/actuation inhaler, Inhale 2 puffs 2 times a day. Rinse mouth with water after use to reduce aftertaste and incidence of candidiasis. Do not swallow. (Patient not taking: Reported on 2/22/2024), Disp: 53 g, Rfl: 3    multivitamin tablet, Take 1 tablet by mouth once daily., Disp: , Rfl:     semaglutide, weight loss, (Wegovy) 0.5 mg/0.5 mL pen injector, Inject 0.5 mg under the skin 1 (one) time per week., Disp: 6 mL, Rfl: 1    valACYclovir (Valtrex) 500 mg tablet, Take 1 tablet (500 mg) by mouth once daily., Disp: 90 tablet, Rfl: 2    ALLERGIES  Allergies   Allergen Reactions    Delsym Anaphylaxis and Unknown    Breo Ellipta [Fluticasone Furoate-Vilanterol] Swelling       PAST MEDICAL HISTORY  Past Medical History:   Diagnosis Date    Abnormal uterine and vaginal bleeding, unspecified 11/09/2015    Abnormal uterine bleeding (AUB)    Acute maxillary sinusitis, unspecified 04/02/2016    Acute maxillary sinusitis, recurrence not specified    Acute pharyngitis, unspecified 02/24/2016    Sore throat    Acute vaginitis 08/15/2017    Acute vaginitis    Acute vaginitis 03/02/2022    Acute vaginitis    Acute vaginitis 02/02/2018    Bacterial vaginosis    Contact with and (suspected) exposure to infections with a predominantly sexual mode of transmission 03/26/2014    Exposure to STD    Cough, unspecified 04/23/2016    Cough    Cough, unspecified 09/29/2015    Cough    Disruption of external operation (surgical) wound, not elsewhere classified, initial encounter 02/16/2016    Vaginal cuff dehiscence    Encounter for gynecological examination (general) (routine) without abnormal findings 08/15/2017    Encounter for routine gynecological examination    Encounter for gynecological examination (general) (routine) without abnormal  findings 09/06/2019    Encounter for routine gynecological examination    Encounter for screening for infections with a predominantly sexual mode of transmission 09/28/2021    Routine screening for STI (sexually transmitted infection)    Encounter for screening for lipoid disorders 05/06/2016    Need for lipid screening    Nausea 09/17/2020    Nausea in adult    Nonspecific chest pain 10/04/2023    Other acute postprocedural pain 01/22/2016    Post-op pain    Other conditions influencing health status     Acute Gonorrhea    Other specified abnormal findings of blood chemistry 07/14/2016    Elevated serum creatinine    Other specified noninflammatory disorders of uterus     Fibrosis of uterus    Other specified postprocedural states 05/10/2016    Post-operative state    Pain in left knee 04/27/2016    Left knee pain    Pain in unspecified limb     Limb pain    Personal history of other diseases of the circulatory system 03/26/2014    History of hypertension    Personal history of other diseases of the digestive system 02/16/2016    History of constipation    Personal history of other diseases of the female genital tract 08/04/2015    History of vaginitis    Personal history of other diseases of the female genital tract 11/09/2015    History of dysmenorrhea    Personal history of other diseases of the female genital tract 03/20/2015    History of vaginal discharge    Personal history of other diseases of the musculoskeletal system and connective tissue     History of bursitis    Personal history of other diseases of the respiratory system 02/24/2016    History of streptococcal pharyngitis    Personal history of other diseases of the respiratory system 03/04/2018    History of influenza    Personal history of other diseases of the respiratory system     History of bronchitis    Personal history of other infectious and parasitic diseases 09/28/2021    History of candidiasis of vagina    Personal history of other  infectious and parasitic diseases 10/12/2016    History of trichomonal vaginitis    Personal history of other medical treatment     History of screening mammography    Personal history of other specified conditions 05/01/2021    History of dysuria    Personal history of other specified conditions 02/08/2015    History of vertigo    Personal history of other specified conditions 12/22/2015    History of urinary frequency    Personal history of other specified conditions 01/16/2015    History of abdominal pain    Personal history of other specified conditions 09/17/2020    History of paresthesia    Personal history of other specified conditions     History of fever    Personal history of other specified conditions 06/05/2017    History of palpitations    Personal history of other specified conditions     History of edema    Personal history of other specified conditions     History of vertigo    Personal history of other specified conditions 04/28/2016    History of urinary frequency    Personal history of urinary (tract) infections 08/30/2014    History of urinary tract infection    Postpartum depression 06/20/2013    Postpartum depression    Radiculopathy, cervical region 01/06/2021    Cervical radiculopathy, acute    Strain of unspecified muscle, fascia and tendon at shoulder and upper arm level, left arm, initial encounter 01/22/2021    Muscle strain of left shoulder    Streptococcal pharyngitis 03/04/2018    Strep pharyngitis    Tachycardia, unspecified     Tachycardia    Unspecified asthma with (acute) exacerbation ([facility]) 11/14/2016    Asthma exacerbation    Unspecified asthma with (acute) exacerbation ([facility]) 02/27/2020    Asthma exacerbation    Unspecified asthma, uncomplicated ([facility]) 02/08/2015    Asthmatic bronchitis    Unspecified pre-eclampsia, unspecified trimester ([facility])     Preeclampsia    Unspecified symptoms and signs involving the genitourinary system 12/14/2021    UTI symptoms       PAST  SURGICAL HISTORY  Past Surgical History:   Procedure Laterality Date     SECTION, CLASSIC  2018     Section    OTHER SURGICAL HISTORY  2013    General Surgery    OTHER SURGICAL HISTORY  2013    Biopsy Renal    OTHER SURGICAL HISTORY  2015    Hysterectomy Robotic-Assisted    OTHER SURGICAL HISTORY  2015    Laparoscopy Myomectomy    TYMPANOSTOMY TUBE PLACEMENT  2013    Ear Pressure Equalization Tube       SOCIAL HISTORY   Social History     Socioeconomic History    Marital status: Single     Spouse name: Not on file    Number of children: Not on file    Years of education: Not on file    Highest education level: Not on file   Occupational History    Not on file   Tobacco Use    Smoking status: Never    Smokeless tobacco: Never   Vaping Use    Vaping status: Never Used   Substance and Sexual Activity    Alcohol use: Yes     Comment: 3 times a year    Drug use: Never    Sexual activity: Not on file   Other Topics Concern    Not on file   Social History Narrative    Not on file     Social Determinants of Health     Financial Resource Strain: Not on file   Food Insecurity: No Food Insecurity (2023)    Hunger Vital Sign     Worried About Running Out of Food in the Last Year: Never true     Ran Out of Food in the Last Year: Never true   Transportation Needs: Not on file   Physical Activity: Not on file   Stress: Not on file   Social Connections: Not on file   Intimate Partner Violence: Not on file   Housing Stability: Not on file       FAMILY HISTORY  Family History   Problem Relation Name Age of Onset    Hypertension Mother      Diabetes Mother      Heart disease Father      Lung disease Father         REVIEW OF SYSTEMS  Except for those mentioned in the history of present illness, and below, a complete review of systems is negative.     Review of Systems    VITALS  There were no vitals filed for this visit.    PHYSICAL EXAMINATION   GENERAL:  Awake, alert, and  oriented, no apparent distress, pleasant, and cooperative  PSYC: Mood is euthymic, affect is congruent  EAR, NOSE, THROAT:  Normocephalic, atraumatic, moist membranes, anicteric sclera  LUNG: Nonlabored breathing  HEART: No clubbing or cyanosis  SKIN: No increased erythema, warmth, rashes, or concerning skin lesions  NEURO: Sensation is intact in the bilateral lower extremities. Strength is grossly 5 out of 5 throughout the bilateral lower extremities, unless noted below.  GAIT: Antalgic  MUSCULOSKELETAL: Examination of the left knee: Range of motion is full and pain-free. No effusion. No patellar apprehension. Tender to palpation over the medial and lateral joint line. Significant tenderness to palpation over the IT band. No tenderness to palpation of the extensor mechanism or patellar facets. Pain reproduced with abduction when side lying with the hip extended. Positive Nelson.     IMAGING STUDIES: Radiographs left knee dated 4/16/2024 were personally reviewed and interpreted by me, Dr. Klaudia Durbin, and the findings shared with the patient.  Mild tricompartmental degenerative change.    Radiographs left hip dated 4/16/2024 were personally reviewed and interpreted by me, Dr. Klaudia Durbin, and the findings shared with the patient.  No significant degenerative change.     IMPRESSION  #1 Left ITB syndrome  #2 History of mild bilateral knee OA    PLAN  The following was discussed with the patient:     Kriss Jhaveri is a very pleasant 43 y.o. female with history of bilateral knee arthritis who is here for evaluation of left knee pain due to left IT band syndrome that flared after [place]2024.  -We discussed the diagnosis of IT band syndrome.  She currently does not appear symptomatic from her knee arthritis.  -We discussed that I anticipate her pain to improve over the next several days and she likely just overdid it.  -She was encouraged to continue ice and  anti-inflammatory supplements and diet.  -Meloxicam was not beneficial so she was given a prescription for Celebrex 100 mg twice a day for 14 days.  She was counseled to stop other NSAIDs while taking this.  -She was also given a referral to physical therapy, which she can begin if she is not getting benefit.  They should focus on core and hip abductor strengthening as well as IT band stretching.  -She was encouraged to avoid aggravating activities and if she were to restart walking to do so gradually.  She can continue her trampoline exercises if tolerated.  -Follow-up with Dr. Burns in 6 weeks if not improving, or sooner if needed.    The patient was counseled to remain active, but avoid activities that worsen symptoms. The patient was in agreement with this plan. All questions were answered to the best of my ability.    PATIENT EDUCATION:  Education was discussed at today's appointment. A learning needs assessment was performed.    Primary learner: Kriss Jhaveri  Barriers to learning: None  Preferred language: English  Learning preferences include: Seeing and doing.  Discussed: Diagnosis and treatment plan.  Demonstrated: Understanding of material discussed.  Patient education materials given: None.  Learner response: Learner demonstrated understanding.    This note was dictated using Dragon speech recognition software and was not corrected for spelling or grammatical errors.    Patient seen and examined with PM&R resident, Dr. Johnson. History, physical examination, pertinent imaging findings and the plan of care were discussed and I performed the key portions of the history, physical examination, and discussion of the plan of care.     Klaudia Durbin MD  [facility] [place]   [facility]

## 2024-04-22 ENCOUNTER — APPOINTMENT (OUTPATIENT)
Dept: INTEGRATIVE MEDICINE | Facility: CLINIC | Age: 44
End: 2024-04-22
Payer: COMMERCIAL

## 2024-04-24 ENCOUNTER — ALLIED HEALTH (OUTPATIENT)
Dept: INTEGRATIVE MEDICINE | Facility: CLINIC | Age: 44
End: 2024-04-24
Payer: COMMERCIAL

## 2024-04-24 DIAGNOSIS — M54.6 THORACIC SPINE PAIN: ICD-10-CM

## 2024-04-24 DIAGNOSIS — M79.18 MYALGIA, OTHER SITE: ICD-10-CM

## 2024-04-24 DIAGNOSIS — M99.02 SEGMENTAL AND SOMATIC DYSFUNCTION OF THORACIC REGION: Primary | ICD-10-CM

## 2024-04-24 DIAGNOSIS — M76.32 ILIOTIBIAL BAND SYNDROME OF LEFT SIDE: ICD-10-CM

## 2024-04-24 PROCEDURE — 98940 CHIROPRACT MANJ 1-2 REGIONS: CPT | Performed by: CHIROPRACTOR

## 2024-04-24 PROCEDURE — 97112 NEUROMUSCULAR REEDUCATION: CPT | Performed by: CHIROPRACTOR

## 2024-04-24 NOTE — PROGRESS NOTES
Subjective   Patient ID: Kriss Jhaveri is a 43 y.o. female who presents April 24, 2024 for chiropractic care.    VPCY    Today, the patient rates their degree of pain as a 6 out of 10 on the numeric pain rating scale.     HPI : Kriss presents to my office for chiropractic care following with main complaints of left lateral thigh and mid/upper back pain. She reports that last Monday she was on a long walk in the park that involved a notable decline. She reports experiencing onset of left lateral thigh pain after this activity localized from the left lateral knee extending superior along the lateral thigh. She denies gluteal or low back discomfort. She reports tenderness to touch, difficulty walking and sleeping. She has used ice at home for relief. She was seen by sports medicine with referral to PT and home recommendations. She cannot get in with PT until late May. Secondary complaint is mid and upper back pain and tension. No radicular complaints reported. Denies other changes to health.       Objective   Physical Exam  Neurological:      General: No focal deficit present.      Mental Status: She is alert and oriented to person, place, and time.      Cranial Nerves: No dysarthria or facial asymmetry.      Sensory: Sensation is intact.      Motor: Motor function is intact.      Coordination: Coordination is intact.      Gait: Gait is intact. Gait normal.       Palpation of the following region(s) revealed:    Thoracic: Thoracic paraspinals bilateral, hypertonicity and tenderness.  Middle trapezius bilateral, hypertonicity and tenderness.  Rhomboids bilateral, hypertonicity and tenderness.  Other:  IT band left, muscular hypertonicity and tenderness.          Segmental Joint(s): Segmental joint dysfunction was assessed with motion palpation and is identified in the following areas:  Thoracic : T3, T5, T6, T7, and T10      Assessment/Plan   Today's Treatment Included: Chiropractic manipulation to the   Segmental  Joint(s) Thoracic : T3, T5, T6, T7, and T10   Treatment Techniques Used : Activator/Tool assisted technique and Manual traction  Neuromuscular Re-Education (64433): Start time: 12:20 pm End time: 12:55 pm  2 Units  Integrative Dry Needling (IDN) - Needles in / out:  8.  IDN: L IT band, BL upper trap, C7-T1 PS    ART, IASTM and cupping was performed in the following areas:   Thoracic Paraspinal mm. bilateral, Middle Trapezius bilateral, Rhomboids bilateral, and Latissimus Dorsi bilateral    Side-lying gentle IASTM to L IT band  RockTape to IT band    F/U within 1 week - continue to follow through with PT     The patient tolerated today's treatment with little or no additional discomfort and was instructed to contact the office for questions or concerns.

## 2024-04-26 ENCOUNTER — ALLIED HEALTH (OUTPATIENT)
Dept: INTEGRATIVE MEDICINE | Facility: CLINIC | Age: 44
End: 2024-04-26
Payer: COMMERCIAL

## 2024-04-26 ENCOUNTER — APPOINTMENT (OUTPATIENT)
Dept: INTEGRATIVE MEDICINE | Facility: CLINIC | Age: 44
End: 2024-04-26
Payer: COMMERCIAL

## 2024-04-26 DIAGNOSIS — M76.32 ILIOTIBIAL BAND SYNDROME OF LEFT SIDE: Primary | ICD-10-CM

## 2024-04-26 PROCEDURE — 97112 NEUROMUSCULAR REEDUCATION: CPT | Performed by: CHIROPRACTOR

## 2024-04-26 NOTE — PROGRESS NOTES
Subjective   Patient ID: Kriss Jhaveri is a 43 y.o. female who presents April 26, 2024 for chiropractic care.    VPCY    Today, the patient rates their degree of pain as a 4 out of 10 on the numeric pain rating scale.     HPI : Kriss returns to my office for chiropractic care of left lateral thigh pain. She reports notable improvement following her treatment earlier this week. She reports reduced sensitivity and improvement in ability to walk with less pain. She returns today to continue treatment on the left lateral thigh. She does note some continued sensitivity, particularly along the lower half of the lateral thigh. She has continued icing at home daily. No radicular complaints reported. Denies other changes to health.       Objective   Physical Exam  Neurological:      General: No focal deficit present.      Mental Status: She is alert and oriented to person, place, and time.      Cranial Nerves: No dysarthria or facial asymmetry.      Sensory: Sensation is intact.      Motor: Motor function is intact.      Coordination: Coordination is intact.      Gait: Gait is intact. Gait normal.         Palpation of the following region(s) revealed:    Thoracic: Thoracic paraspinals bilateral, hypertonicity and tenderness.  Middle trapezius bilateral, hypertonicity and tenderness.  Rhomboids bilateral, hypertonicity and tenderness.  Other:  IT band left, muscular hypertonicity and tenderness.          Segmental Joint(s): Segmental joint dysfunction was assessed with motion palpation and is identified in the following areas:  Thoracic : T3, T5, T6, T7, and T10      Assessment/Plan   Today's Treatment Included: Neuromuscular Re-Education (29910): Start time: 1:40 pm End time: 1:55 pm  1 Units  Integrative Dry Needling (IDN) - Needles in / out:  9.  IDN: L IT band    ART and IASTM was performed in the following areas:   Other IT band, quad left    Side-lying gentle IASTM to L IT band  RockTape to IT band    F/U in 1-2  weeks    The patient tolerated today's treatment with little or no additional discomfort and was instructed to contact the office for questions or concerns.

## 2024-04-29 ENCOUNTER — APPOINTMENT (OUTPATIENT)
Dept: INTEGRATIVE MEDICINE | Facility: CLINIC | Age: 44
End: 2024-04-29
Payer: COMMERCIAL

## 2024-05-02 ENCOUNTER — ALLIED HEALTH (OUTPATIENT)
Dept: INTEGRATIVE MEDICINE | Facility: CLINIC | Age: 44
End: 2024-05-02
Payer: COMMERCIAL

## 2024-05-02 DIAGNOSIS — M99.06 SEGMENTAL AND SOMATIC DYSFUNCTION OF LOWER EXTREMITY: ICD-10-CM

## 2024-05-02 DIAGNOSIS — M76.32 ILIOTIBIAL BAND SYNDROME OF LEFT SIDE: Primary | ICD-10-CM

## 2024-05-02 PROCEDURE — 98943 CHIROPRACT MANJ XTRSPINL 1/>: CPT | Performed by: CHIROPRACTOR

## 2024-05-02 PROCEDURE — 97112 NEUROMUSCULAR REEDUCATION: CPT | Performed by: CHIROPRACTOR

## 2024-05-02 NOTE — PROGRESS NOTES
"Subjective   Patient ID: Kriss Jhaveri is a 43 y.o. female who presents May 2, 2024 for chiropractic care.    VPCY    Today, the patient rates their degree of pain as a 4 out of 10 on the numeric pain rating scale.     HPI : Kriss returns to my office for chiropractic care of left lateral thigh pain.  She's continued to experience gradual improvement in left IT band pain. However, reports that on Wednesday she felt a \"sharp\" discomfort while sitting along the lower half of the left IT band region. She has continued to ice daily. She is scheduled with physical therapy in mid-May but would like to continue receiving treatments until then.  Denies other changes to health.       Objective   Physical Exam  Neurological:      General: No focal deficit present.      Mental Status: She is alert and oriented to person, place, and time.      Cranial Nerves: No dysarthria or facial asymmetry.      Sensory: Sensation is intact.      Motor: Motor function is intact.      Coordination: Coordination is intact.      Gait: Gait is intact. Gait normal.         Palpation of the following region(s) revealed:  IT band - left; distal 1/2 HTN and tenderness  Quad - left; distal and lateral portion - HTN and tenderness    Improved tone of proximal 1/2 of left IT band  Improved gait      Segmental Joint(s): Segmental joint dysfunction was assessed with motion palpation and is identified in the following areas:  Upper / Lower Extremities : L Knee      Assessment/Plan   Today's Treatment Included: Chiropractic manipulation to the    Segmental Joints Upper/Lower Extremities : L Knee  Treatment Techniques Used : Manual traction and Low force  Neuromuscular Re-Education (45265): Start time: 8:20 am End time: 8:355 am  1 Units  Integrative Dry Needling (IDN) - Needles in / out:  7.  IDN: L IT band, L distal quad    ART and IASTM was performed in the following areas:   Other IT band, quad left    Side-lying gentle IASTM to L IT band and " quad    RockTape to IT band    F/U in 1 week until scheduled with PT in mid-May    The patient tolerated today's treatment with little or no additional discomfort and was instructed to contact the office for questions or concerns.

## 2024-05-09 ENCOUNTER — ALLIED HEALTH (OUTPATIENT)
Dept: INTEGRATIVE MEDICINE | Facility: CLINIC | Age: 44
End: 2024-05-09
Payer: COMMERCIAL

## 2024-05-09 DIAGNOSIS — M25.562 CHRONIC PAIN OF BOTH KNEES: ICD-10-CM

## 2024-05-09 DIAGNOSIS — M76.32 ILIOTIBIAL BAND SYNDROME OF LEFT SIDE: Primary | ICD-10-CM

## 2024-05-09 DIAGNOSIS — G89.29 CHRONIC PAIN OF BOTH KNEES: ICD-10-CM

## 2024-05-09 DIAGNOSIS — M25.561 CHRONIC PAIN OF BOTH KNEES: ICD-10-CM

## 2024-05-09 PROCEDURE — 97112 NEUROMUSCULAR REEDUCATION: CPT | Performed by: CHIROPRACTOR

## 2024-05-09 NOTE — PROGRESS NOTES
Subjective   Patient ID: Kriss Jhaveri is a 43 y.o. female who presents May 9, 2024 for chiropractic care.    VPCY    Today, the patient rates their degree of pain as a 2 out of 10 on the numeric pain rating scale.     HPI : Kriss returns to my office for chiropractic care of left lateral thigh pain. She's found the dry needling quite helpful and notes overall improvement in left IT band pain. She also reports reduced knee pain with treatments. She is back to walking on her walking pad without major issue. However, does note that now her right lateral thigh is feeling tighter than the left. Will be starting physical therapy next week. Denies other changes to health.       Objective   Physical Exam  Neurological:      General: No focal deficit present.      Mental Status: She is alert and oriented to person, place, and time.      Cranial Nerves: No dysarthria or facial asymmetry.      Sensory: Sensation is intact.      Motor: Motor function is intact.      Coordination: Coordination is intact.      Gait: Gait is intact. Gait normal.         Palpation of the following region(s) revealed:  IT band - bilateral; (L) distal 1/2 HTN and tenderness  Quad - left; distal and lateral portion - HTN and tenderness    Improved tone of proximal 1/2 of left IT band  Improved gait      Segmental Joint(s): Segmental joint dysfunction was assessed with motion palpation and is identified in the following areas:  Upper / Lower Extremities : L Knee      Assessment/Plan   Today's Treatment Included: Chiropractic manipulation to the    Segmental Joints Upper/Lower Extremities : L Knee  Treatment Techniques Used : Manual traction  Neuromuscular Re-Education (59297): Start time: 8:00 am End time: 8:15 am  1 Units  Integrative Dry Needling (IDN) - Needles in / out:  10.  IDN: BL IT band, LB distal quad    ART and IASTM was performed in the following areas:   Other IT band, quad bilateral    Side-lying gentle IASTM to L IT band and  quad    F/U as needed until scheduled with PT in mid-May    The patient tolerated today's treatment with little or no additional discomfort and was instructed to contact the office for questions or concerns.

## 2024-05-17 ENCOUNTER — EVALUATION (OUTPATIENT)
Dept: PHYSICAL THERAPY | Facility: CLINIC | Age: 44
End: 2024-05-17
Payer: COMMERCIAL

## 2024-05-17 DIAGNOSIS — M76.32 IT BAND SYNDROME, LEFT: ICD-10-CM

## 2024-05-17 DIAGNOSIS — M76.32 ILIOTIBIAL BAND SYNDROME OF LEFT SIDE: ICD-10-CM

## 2024-05-17 DIAGNOSIS — M17.12 OSTEOARTHRITIS OF LEFT KNEE, UNSPECIFIED OSTEOARTHRITIS TYPE: Primary | ICD-10-CM

## 2024-05-17 PROCEDURE — 97161 PT EVAL LOW COMPLEX 20 MIN: CPT | Mod: GP | Performed by: PHYSICAL THERAPIST

## 2024-05-17 PROCEDURE — 97140 MANUAL THERAPY 1/> REGIONS: CPT | Mod: GP | Performed by: PHYSICAL THERAPIST

## 2024-05-17 PROCEDURE — 97110 THERAPEUTIC EXERCISES: CPT | Mod: GP | Performed by: PHYSICAL THERAPIST

## 2024-05-17 NOTE — PROGRESS NOTES
Physical Therapy  Physical Therapy Orthopedic Evaluation    Patient Name: Kriss Jhaveri  MRN: [id number]  Today's Date: 5/17/2024  Time Calculation  Start Time: 0830  Stop Time: 0912  Time Calculation (min): 42 min    PT Evaluation Time Entry  PT Evaluation (Low) Time Entry: 10  PT Therapeutic Procedures Time Entry  Manual Therapy Time Entry: 10  Therapeutic Exercise Time Entry: 13       Insurance:  Visit number: 1 of 26  Authorization info: No Auth  Insurance Type: Payor: ANTHEM / Plan: ANTHEM HMP / Product Type: *No Product type* /      Current Problem  1. Osteoarthritis of left knee, unspecified osteoarthritis type  Follow Up In Physical Therapy    Follow Up In Physical Therapy      2. It band syndrome, left  Follow Up In Physical Therapy    Follow Up In Physical Therapy      3. Iliotibial band syndrome of left side  Referral to Physical Therapy          Surgery:NO    Referred by: Klaudia Durbin MD  Precautions:     Medical History Form: Reviewed (scanned into chart)    Subjective:   Subjective   Chief Complaint: L lateral knee pain  Onset: 4/15/24  KATHE: walking on a decline at the park    General:   She has previously had steroid and synvic injections for her bilateral knee OA by Dr. Burns. She did a 5 mile incline/decline walk in the park yesterday morning. She developed pain later in the day. Pain is achy and from the knee up the lateral thigh. She had difficulty sleeping due to the pain. She took meloxicam and is eating inflammatory foods without relief.  She is also used ice without relief.  She states that this pain is different than her typical pain from knee arthritis.   Current Condition:   Better, still discomfort with walking for too long.     Pain:     Location: Lateral IT band   Rating: Best-0, Current-0, Worst-3  Description: dull ache   Aggravating Factors:  general strength training   Relieving Factors:  Ice, elevation, tylenol PRN    Relevant Information (PMH & Previous  Tests/Imaging): left knee dated 4/16/2024    Radiographs left hip dated 4/16/2024  No significant degenerative change.    Previous Interventions/Treatments: Chiropractic, needling, scraping, and taping    Prior Level of Function (PLOF)  Patient previously independent with all ADLs  Exercise/Physical Activity: walking for exercise   Work/School: RN    Patients Living Environment: Reviewed and no concern  Primary Language: English  Patient's Goal(s) for Therapy: strengthen leg     Red Flags: Do you have any of the following? No  Fever/chills, unexplained weight changes, dizziness/fainting, unexplained change in bowel or bladder functions, unexplained malaise or muscle weakness, night pain/sweats, numbness or tingling    Objective:  Objective     Palpation/Observation  TTP distal IT band   Posture  Carl Pes planus, mild valgus positioning   Special Testing  + obers test, + patellar grind test   ROM  5/17/2024  + obers test, Hip ROM WNL, Knee ROM WNL  Strength  5/17/2024  L hs 4/5, L hip abd 4/5, carl hip ER 4/5  Sensation  Increased sensitivity distal IT band   Reflexes  NT    Transfers: NT  Gait: WNL  SL Balance: NT  DL Squat: sit to stand WNL  SL Squat: NT     Outcome Measures:  Other Measures  Lower Extremity Funtional Score (LEFS): 80/80   5/17/2024  Treatments:  Ther-EX:  - Sidelying Hip Abduction  - 1 x daily - 7 x weekly - 3 sets - 10 reps  - Clam with Resistance  - 1 x daily - 7 x weekly - 3 sets - 10 reps  - Supine Bridge  - 1 x daily - 7 x weekly - 3 sets - 10 reps  - Lower Trunk Rotations  - 1 x daily - 7 x weekly - 3 sets - 10 reps  There- ACT:    Neuro:    Manual:  STM distal IT band R side lying   Modalities:    Educated on orthotics  PT Evaluation Time Entry  PT Evaluation (Low) Time Entry: 10  PT Therapeutic Procedures Time Entry  Manual Therapy Time Entry: 10  Therapeutic Exercise Time Entry: 13       EDUCATION: Home exercise program, plan of care, activity modifications, pain management, and injury  pathology     Code:  [id number]    Assessment: Patient presents with signs and symptoms consistent with L hip IT band syndrome, resulting in limited participation in pain-free ADLs and inability to perform at their prior level of function. Pt would benefit from physical therapy to address the impairments found & listed previously in the objective section in order to return to safe and pain-free ADLs and prior level of function.     Complexity:Low  Prognosis: Good  Response to care: Good    Problem List:  Pain  Function  Mobility  Strength  Plan:     Planned Interventions include: therapeutic exercise, self-care home management, manual therapy, therapeutic activities, gait training, neuromuscular coordination, vasopneumatic, dry needling, aquatic therapy    Next Treatment: quadraped ext, sidelying shuttle press, hip hike, fire hydrants   Frequency: 1-2 x Week  Duration: 8 Weeks  Goals: Set and discussed today  Active       PT Problem       PT Goal 1       Start:  05/17/24    Expected End:  07/12/24       1. Independent with HEP for improved transition  2. Patient to have negative obers test for decreased sensitivity  3. Hip abd 4+/5 for decreased LE stress  4. Pain less than 3/10 for laying on L side  5. Patient able to return to gym activities without restriction              Plan of care was developed with input and agreement by the patient      Julio Sterling, PT

## 2024-05-20 ENCOUNTER — SPECIALTY PHARMACY (OUTPATIENT)
Dept: PHARMACY | Facility: CLINIC | Age: 44
End: 2024-05-20

## 2024-05-20 ENCOUNTER — OFFICE VISIT (OUTPATIENT)
Dept: OBSTETRICS AND GYNECOLOGY | Facility: CLINIC | Age: 44
End: 2024-05-20
Payer: COMMERCIAL

## 2024-05-20 VITALS
DIASTOLIC BLOOD PRESSURE: 78 MMHG | WEIGHT: 187.4 LBS | BODY MASS INDEX: 28.4 KG/M2 | SYSTOLIC BLOOD PRESSURE: 120 MMHG | HEIGHT: 68 IN

## 2024-05-20 DIAGNOSIS — K59.00 CONSTIPATION, UNSPECIFIED CONSTIPATION TYPE: ICD-10-CM

## 2024-05-20 DIAGNOSIS — Z01.419 WELL WOMAN EXAM WITH ROUTINE GYNECOLOGICAL EXAM: Primary | ICD-10-CM

## 2024-05-20 DIAGNOSIS — A60.00 GENITAL HERPES SIMPLEX, UNSPECIFIED SITE: ICD-10-CM

## 2024-05-20 DIAGNOSIS — Z12.31 BREAST CANCER SCREENING BY MAMMOGRAM: ICD-10-CM

## 2024-05-20 PROCEDURE — 1036F TOBACCO NON-USER: CPT | Performed by: NURSE PRACTITIONER

## 2024-05-20 PROCEDURE — 3008F BODY MASS INDEX DOCD: CPT | Performed by: NURSE PRACTITIONER

## 2024-05-20 PROCEDURE — 99396 PREV VISIT EST AGE 40-64: CPT | Performed by: NURSE PRACTITIONER

## 2024-05-20 PROCEDURE — 3078F DIAST BP <80 MM HG: CPT | Performed by: NURSE PRACTITIONER

## 2024-05-20 PROCEDURE — 3074F SYST BP LT 130 MM HG: CPT | Performed by: NURSE PRACTITIONER

## 2024-05-20 RX ORDER — DOCUSATE SODIUM 100 MG/1
100 CAPSULE, LIQUID FILLED ORAL 2 TIMES DAILY PRN
Qty: 90 CAPSULE | Refills: 2 | Status: SHIPPED | OUTPATIENT
Start: 2024-05-20

## 2024-05-20 RX ORDER — VALACYCLOVIR HYDROCHLORIDE 500 MG/1
500 TABLET, FILM COATED ORAL DAILY
Qty: 90 TABLET | Refills: 2 | Status: SHIPPED | OUTPATIENT
Start: 2024-05-20

## 2024-05-20 ASSESSMENT — ENCOUNTER SYMPTOMS
MUSCULOSKELETAL NEGATIVE: 0
CONSTITUTIONAL NEGATIVE: 0
NEUROLOGICAL NEGATIVE: 0
ALLERGIC/IMMUNOLOGIC NEGATIVE: 0
HEMATOLOGIC/LYMPHATIC NEGATIVE: 0
RESPIRATORY NEGATIVE: 0
ENDOCRINE NEGATIVE: 0
PSYCHIATRIC NEGATIVE: 0
GASTROINTESTINAL NEGATIVE: 0
EYES NEGATIVE: 0
CARDIOVASCULAR NEGATIVE: 0

## 2024-05-20 ASSESSMENT — PAIN SCALES - GENERAL: PAINLEVEL: 0-NO PAIN

## 2024-05-20 NOTE — PROGRESS NOTES
Subjective   Patient ID: Kriss Jhaveri is a 43 y.o. female who presents for Annual Exam.  HPI  Menopausal age: hysterectomy     Any Contraindications to HT: personal h/o breast cancer, estrogen sensitive cancer, dementia, stroke, MI, VTE or inherited high risk for VTE, SCAD: HTN that is diet controlled  h/o congenital heart disease (increased risk of DVT) none  Father h/o PE and DVT     HT: 0.0275 estradiol patch  Taking Ashwagandha     VMS: none  Sleep difficulties: none  Mood changes: none  Joint pain: none  Brain fog/difficulty concentrating: none     GSM: yes, treated with Imvexxy  are you sexually active: yes  dyspareunia: none  decrease in desire: none  difficulty reaching orgasm:  none  Urinary Incontinence: urge occasional     H/o HSV: suppression therapy  On wegovy  Requesting colace for constipation  Working with dermatologist for dry facial skin and acne       Exercise: yes  weight bearing: yes  Recently hurt her back and is working with PT    No concern for STI screening today    FH:  Breast: paternal aunt  Ovarian: none  Pancreatic: none  Colon: none    Review of Systems    Objective   Physical Exam  Vitals and nursing note reviewed.   Constitutional:       Appearance: Normal appearance.   Neck:      Thyroid: No thyroid mass.   Cardiovascular:      Rate and Rhythm: Normal rate and regular rhythm.      Heart sounds: Normal heart sounds.   Pulmonary:      Effort: Pulmonary effort is normal.      Breath sounds: Normal breath sounds.   Chest:   Breasts:     Right: Normal.      Left: Normal.   Abdominal:      Tenderness: There is no abdominal tenderness.   Genitourinary:     General: Normal vulva.      Exam position: Lithotomy position.      Labia:         Right: No lesion.         Left: No lesion.    Skin:     General: Skin is warm and dry.   Neurological:      Mental Status: She is alert.   Psychiatric:         Attention and Perception: Attention normal.         Mood and Affect: Mood normal.          Speech: Speech normal.         Behavior: Behavior normal.         Thought Content: Thought content normal.         Cognition and Memory: Cognition and memory normal.         Judgment: Judgment normal.         Assessment/Plan   Diagnoses and all orders for this visit:  Well woman exam with routine gynecological exam  Breast cancer screening by mammogram  -     BI mammo bilateral screening tomosynthesis; Future  Genital herpes simplex, unspecified site  -     valACYclovir (Valtrex) 500 mg tablet; Take 1 tablet (500 mg) by mouth once daily.  Constipation, unspecified constipation type  -     docusate sodium (Colace) 100 mg capsule; Take 1 capsule (100 mg) by mouth 2 times a day as needed for constipation.           Leonardo Santiago, NIKOS-CNP 05/20/24 8:41 AM

## 2024-05-22 ENCOUNTER — ALLIED HEALTH (OUTPATIENT)
Dept: INTEGRATIVE MEDICINE | Facility: CLINIC | Age: 44
End: 2024-05-22
Payer: COMMERCIAL

## 2024-05-22 DIAGNOSIS — M76.32 ILIOTIBIAL BAND SYNDROME OF LEFT SIDE: Primary | ICD-10-CM

## 2024-05-22 PROCEDURE — 97112 NEUROMUSCULAR REEDUCATION: CPT | Performed by: CHIROPRACTOR

## 2024-05-22 NOTE — PROGRESS NOTES
Subjective   Patient ID: Kriss Jhaveri is a 43 y.o. female who presents May 22, 2024 for chiropractic care.    VPCY    Today, the patient rates their degree of pain as a 4 out of 10 on the numeric pain rating scale.     HPI : Kriss returns to my office for chiropractic care of left IT band pain. She was seen by physical therapy on 05/17 and reports she tolerated her initial session well and has been diligent about her home exercises. The IT band continues to improve but does remain tender along the lower 1/2. She also reports pain localized to the right proximal hamstring attachment. She was feeling mild discomfort here but felt it was worsened after sitting for a short period of time on an exercise ball. She does report experiencing numbness/tingling from this region down the leg intermittently. Denies other changes to health and will F/U with physical therapy in early June.       Objective   Physical Exam  Neurological:      General: No focal deficit present.      Mental Status: She is alert and oriented to person, place, and time.      Cranial Nerves: No dysarthria or facial asymmetry.      Sensory: Sensation is intact.      Motor: Motor function is intact.      Coordination: Coordination is intact.      Gait: Gait is intact. Gait normal.         Palpation of the following region(s) revealed:  IT band - (L) distal 1/2 HTN and tenderness  Quad - left; HTN  L proximal hamstring and glute - HTN and tenderness    Segmental Joint(s): Segmental joint dysfunction was assessed with motion palpation and is identified in the following areas:  Upper / Lower Extremities : L Knee      Assessment/Plan   Today's Treatment Included: Neuromuscular Re-Education (76011): Start time: 12:20 am End time: 12:45 am  2 Units  Integrative Dry Needling (IDN) - Needles in / out:  7.  IDN: L IT band, L proximal hamstring    ART, cupping and IASTM was performed in the following areas:   Other IT band, quad bilateral, proximal L hamstring  and glute    Side-lying gentle IASTM to L IT band     RockTape to L IT band and glute    F/U as needed and continue with physical therapy    The patient tolerated today's treatment with little or no additional discomfort and was instructed to contact the office for questions or concerns.

## 2024-06-07 ENCOUNTER — TREATMENT (OUTPATIENT)
Dept: PHYSICAL THERAPY | Facility: CLINIC | Age: 44
End: 2024-06-07
Payer: COMMERCIAL

## 2024-06-07 DIAGNOSIS — M76.32 IT BAND SYNDROME, LEFT: ICD-10-CM

## 2024-06-07 DIAGNOSIS — M17.12 OSTEOARTHRITIS OF LEFT KNEE, UNSPECIFIED OSTEOARTHRITIS TYPE: ICD-10-CM

## 2024-06-07 PROCEDURE — 97140 MANUAL THERAPY 1/> REGIONS: CPT | Mod: GP | Performed by: PHYSICAL THERAPIST

## 2024-06-07 PROCEDURE — 97110 THERAPEUTIC EXERCISES: CPT | Mod: GP | Performed by: PHYSICAL THERAPIST

## 2024-06-07 NOTE — PROGRESS NOTES
"  Physical Therapy  Physical Therapy Treatment Note    Patient Name: Kriss Jhaveri  MRN: [id number]  Today's Date: 6/7/2024          PT Therapeutic Procedures Time Entry  Manual Therapy Time Entry: 15  Therapeutic Exercise Time Entry: 25     Referring:Klaudia Durbin MD     Insurance:  Visit number: 2 of 26    Authorization info: No auth  Insurance Type: Payor: ANTHEM / Plan: ANTHEM HMP / Product Type: *No Product type* /     Current Problem  1. Osteoarthritis of left knee, unspecified osteoarthritis type  Follow Up In Physical Therapy      2. It band syndrome, left  Follow Up In Physical Therapy          General    She has previously had steroid and synvic injections for her bilateral knee OA by Dr. Burns. She did a 5 mile incline/decline walk in the park yesterday morning. She developed pain later in the day. Pain is achy and from the knee up the lateral thigh. She had difficulty sleeping due to the pain. She took meloxicam and is eating inflammatory foods without relief.  She is also used ice without relief.  She states that this pain is different than her typical pain from knee arthritis.   Current Condition:   Better, still discomfort with walking for too long.     Precautions       Subjective:   Subjective   Patient reports she is doing good, some numbness through the L glute, still doing chiro treatment  Pain     Objective:   Objective   Lower Extremity Funtional Score (LEFS): 80/80   5/17/2024  ROM  5/17/2024  + obers test, Hip ROM WNL, Knee ROM WNL  Strength  5/17/2024  L hs 4/5, L hip abd 4/5, inés hip ER 4/5    Treatments:   Ther Ex  Bike x 5'  Half kneeling hip flexor 2 x 1'  Split squat YTB 2 x 10 ea   Elevated wall squat with slant board 5 x 30\"  Side plank 5 x 15\"  Manual  STM/IASTM L IT band  Neuro Re-ed    There-Act    Modalities         PT Therapeutic Procedures Time Entry  Manual Therapy Time Entry: 15  Therapeutic Exercise Time Entry: 25     HEP Access Code:[email or web address]   Assessment:  mild " inés PF crepitance during the session, patient fatigued with quad and glute med strengthening      Plan: continue plan of care      Goals:  Active       PT Problem       PT Goal 1       Start:  05/17/24    Expected End:  07/12/24       1. Independent with HEP for improved transition  2. Patient to have negative obers test for decreased sensitivity  3. Hip abd 4+/5 for decreased LE stress  4. Pain less than 3/10 for laying on L side  5. Patient able to return to gym activities without restriction               Julio Sterling, PT

## 2024-06-12 RX ORDER — SEMAGLUTIDE 0.5 MG/.5ML
0.5 INJECTION, SOLUTION SUBCUTANEOUS
Qty: 6 ML | Refills: 1 | Status: SHIPPED | OUTPATIENT
Start: 2024-06-16 | End: 2024-12-13

## 2024-06-13 ENCOUNTER — SPECIALTY PHARMACY (OUTPATIENT)
Dept: PHARMACY | Facility: CLINIC | Age: 44
End: 2024-06-13

## 2024-06-17 PROCEDURE — RXMED WILLOW AMBULATORY MEDICATION CHARGE

## 2024-06-18 ENCOUNTER — PHARMACY VISIT (OUTPATIENT)
Dept: PHARMACY | Facility: CLINIC | Age: 44
End: 2024-06-18
Payer: MEDICARE

## 2024-06-21 ENCOUNTER — APPOINTMENT (OUTPATIENT)
Dept: PHYSICAL THERAPY | Facility: CLINIC | Age: 44
End: 2024-06-21
Payer: COMMERCIAL

## 2024-07-02 DIAGNOSIS — J30.9 ALLERGIC RHINITIS, UNSPECIFIED SEASONALITY, UNSPECIFIED TRIGGER: ICD-10-CM

## 2024-07-03 RX ORDER — CETIRIZINE HYDROCHLORIDE 10 MG/1
10 TABLET ORAL DAILY
Qty: 30 TABLET | Refills: 2 | Status: SHIPPED | OUTPATIENT
Start: 2024-07-03

## 2024-07-05 ENCOUNTER — APPOINTMENT (OUTPATIENT)
Dept: INTEGRATIVE MEDICINE | Facility: CLINIC | Age: 44
End: 2024-07-05
Payer: COMMERCIAL

## 2024-08-01 ENCOUNTER — APPOINTMENT (OUTPATIENT)
Dept: INTEGRATIVE MEDICINE | Facility: CLINIC | Age: 44
End: 2024-08-01
Payer: COMMERCIAL

## 2024-08-15 ENCOUNTER — SPECIALTY PHARMACY (OUTPATIENT)
Dept: PHARMACY | Facility: CLINIC | Age: 44
End: 2024-08-15

## 2024-08-28 ENCOUNTER — LAB (OUTPATIENT)
Dept: LAB | Facility: LAB | Age: 44
End: 2024-08-28
Payer: COMMERCIAL

## 2024-08-28 ENCOUNTER — APPOINTMENT (OUTPATIENT)
Dept: PRIMARY CARE | Facility: CLINIC | Age: 44
End: 2024-08-28
Payer: COMMERCIAL

## 2024-08-28 VITALS
WEIGHT: 181.8 LBS | DIASTOLIC BLOOD PRESSURE: 70 MMHG | OXYGEN SATURATION: 95 % | HEART RATE: 72 BPM | BODY MASS INDEX: 28.53 KG/M2 | SYSTOLIC BLOOD PRESSURE: 138 MMHG | HEIGHT: 67 IN

## 2024-08-28 DIAGNOSIS — Z00.00 ANNUAL PHYSICAL EXAM: Primary | ICD-10-CM

## 2024-08-28 DIAGNOSIS — Z72.51 HIGH RISK HETEROSEXUAL BEHAVIOR: ICD-10-CM

## 2024-08-28 DIAGNOSIS — Z00.00 ANNUAL PHYSICAL EXAM: ICD-10-CM

## 2024-08-28 LAB
ALBUMIN SERPL BCP-MCNC: 4.1 G/DL (ref 3.4–5)
ALP SERPL-CCNC: 48 U/L (ref 33–110)
ALT SERPL W P-5'-P-CCNC: 7 U/L (ref 7–45)
ANION GAP SERPL CALC-SCNC: 13 MMOL/L (ref 10–20)
AST SERPL W P-5'-P-CCNC: 14 U/L (ref 9–39)
BASOPHILS # BLD AUTO: 0.05 X10*3/UL (ref 0–0.1)
BASOPHILS NFR BLD AUTO: 0.8 %
BILIRUB SERPL-MCNC: 0.7 MG/DL (ref 0–1.2)
BUN SERPL-MCNC: 16 MG/DL (ref 6–23)
CALCIUM SERPL-MCNC: 9.4 MG/DL (ref 8.6–10.3)
CHLORIDE SERPL-SCNC: 102 MMOL/L (ref 98–107)
CHOLEST SERPL-MCNC: 193 MG/DL (ref 0–199)
CHOLESTEROL/HDL RATIO: 2.5
CO2 SERPL-SCNC: 28 MMOL/L (ref 21–32)
CREAT SERPL-MCNC: 0.84 MG/DL (ref 0.5–1.05)
EGFRCR SERPLBLD CKD-EPI 2021: 89 ML/MIN/1.73M*2
EOSINOPHIL # BLD AUTO: 0.12 X10*3/UL (ref 0–0.7)
EOSINOPHIL NFR BLD AUTO: 2 %
ERYTHROCYTE [DISTWIDTH] IN BLOOD BY AUTOMATED COUNT: 12.7 % (ref 11.5–14.5)
GLUCOSE SERPL-MCNC: 72 MG/DL (ref 74–99)
HCT VFR BLD AUTO: 41.6 % (ref 36–46)
HDLC SERPL-MCNC: 76.2 MG/DL
HGB BLD-MCNC: 13.3 G/DL (ref 12–16)
IMM GRANULOCYTES # BLD AUTO: 0.02 X10*3/UL (ref 0–0.7)
IMM GRANULOCYTES NFR BLD AUTO: 0.3 % (ref 0–0.9)
LDLC SERPL CALC-MCNC: 106 MG/DL
LYMPHOCYTES # BLD AUTO: 2.07 X10*3/UL (ref 1.2–4.8)
LYMPHOCYTES NFR BLD AUTO: 34.7 %
MCH RBC QN AUTO: 29.9 PG (ref 26–34)
MCHC RBC AUTO-ENTMCNC: 32 G/DL (ref 32–36)
MCV RBC AUTO: 94 FL (ref 80–100)
MONOCYTES # BLD AUTO: 0.33 X10*3/UL (ref 0.1–1)
MONOCYTES NFR BLD AUTO: 5.5 %
NEUTROPHILS # BLD AUTO: 3.38 X10*3/UL (ref 1.2–7.7)
NEUTROPHILS NFR BLD AUTO: 56.7 %
NON HDL CHOLESTEROL: 117 MG/DL (ref 0–149)
NRBC BLD-RTO: 0 /100 WBCS (ref 0–0)
PLATELET # BLD AUTO: 279 X10*3/UL (ref 150–450)
POTASSIUM SERPL-SCNC: 4.1 MMOL/L (ref 3.5–5.3)
PROT SERPL-MCNC: 6.7 G/DL (ref 6.4–8.2)
RBC # BLD AUTO: 4.45 X10*6/UL (ref 4–5.2)
SODIUM SERPL-SCNC: 139 MMOL/L (ref 136–145)
TRIGL SERPL-MCNC: 53 MG/DL (ref 0–149)
VLDL: 11 MG/DL (ref 0–40)
WBC # BLD AUTO: 6 X10*3/UL (ref 4.4–11.3)

## 2024-08-28 PROCEDURE — 85025 COMPLETE CBC W/AUTO DIFF WBC: CPT

## 2024-08-28 PROCEDURE — 87389 HIV-1 AG W/HIV-1&-2 AB AG IA: CPT

## 2024-08-28 PROCEDURE — 80053 COMPREHEN METABOLIC PANEL: CPT

## 2024-08-28 PROCEDURE — 86696 HERPES SIMPLEX TYPE 2 TEST: CPT

## 2024-08-28 PROCEDURE — 80061 LIPID PANEL: CPT

## 2024-08-28 PROCEDURE — 86695 HERPES SIMPLEX TYPE 1 TEST: CPT

## 2024-08-28 PROCEDURE — 36415 COLL VENOUS BLD VENIPUNCTURE: CPT

## 2024-08-28 RX ORDER — SEMAGLUTIDE 1 MG/.5ML
1 INJECTION, SOLUTION SUBCUTANEOUS WEEKLY
Qty: 2 ML | Refills: 0 | Status: SHIPPED | OUTPATIENT
Start: 2024-08-28 | End: 2024-08-28

## 2024-08-28 RX ORDER — SEMAGLUTIDE 1 MG/.5ML
1 INJECTION, SOLUTION SUBCUTANEOUS WEEKLY
Qty: 2 ML | Refills: 0 | Status: SHIPPED | OUTPATIENT
Start: 2024-08-28 | End: 2024-09-26

## 2024-08-28 ASSESSMENT — PATIENT HEALTH QUESTIONNAIRE - PHQ9
2. FEELING DOWN, DEPRESSED OR HOPELESS: NOT AT ALL
1. LITTLE INTEREST OR PLEASURE IN DOING THINGS: NOT AT ALL
SUM OF ALL RESPONSES TO PHQ9 QUESTIONS 1 AND 2: 0

## 2024-08-28 NOTE — PROGRESS NOTES
History Of Present Illness  Kriss Jhaveri is a 44 y.o. female presents to the office for cpe.   Remeron algae 3 tablets daily- for hsv infection   Sharp shooting pains/ sensation changes,nerve pain   Radiculomyelopathy     Changes in her regimen for menopausal          Past Medical History  She has a past medical history of Abnormal uterine and vaginal bleeding, unspecified (11/09/2015), Acute maxillary sinusitis, unspecified (04/02/2016), Acute pharyngitis, unspecified (02/24/2016), Acute vaginitis (08/15/2017), Acute vaginitis (03/02/2022), Acute vaginitis (02/02/2018), Contact with and (suspected) exposure to infections with a predominantly sexual mode of transmission (03/26/2014), Cough, unspecified (04/23/2016), Cough, unspecified (09/29/2015), Disruption of external operation (surgical) wound, not elsewhere classified, initial encounter (02/16/2016), Encounter for gynecological examination (general) (routine) without abnormal findings (08/15/2017), Encounter for gynecological examination (general) (routine) without abnormal findings (09/06/2019), Encounter for screening for infections with a predominantly sexual mode of transmission (09/28/2021), Encounter for screening for lipoid disorders (05/06/2016), Nausea (09/17/2020), Nonspecific chest pain (10/04/2023), Other acute postprocedural pain (01/22/2016), Other conditions influencing health status, Other specified abnormal findings of blood chemistry (07/14/2016), Other specified noninflammatory disorders of uterus, Other specified postprocedural states (05/10/2016), Pain in left knee (04/27/2016), Pain in unspecified limb, Personal history of other diseases of the circulatory system (03/26/2014), Personal history of other diseases of the digestive system (02/16/2016), Personal history of other diseases of the female genital tract (08/04/2015), Personal history of other diseases of the female genital tract (11/09/2015), Personal history of other diseases  of the female genital tract (03/20/2015), Personal history of other diseases of the musculoskeletal system and connective tissue, Personal history of other diseases of the respiratory system (02/24/2016), Personal history of other diseases of the respiratory system (03/04/2018), Personal history of other diseases of the respiratory system, Personal history of other infectious and parasitic diseases (09/28/2021), Personal history of other infectious and parasitic diseases (10/12/2016), Personal history of other medical treatment, Personal history of other specified conditions (05/01/2021), Personal history of other specified conditions (02/08/2015), Personal history of other specified conditions (12/22/2015), Personal history of other specified conditions (01/16/2015), Personal history of other specified conditions (09/17/2020), Personal history of other specified conditions, Personal history of other specified conditions (06/05/2017), Personal history of other specified conditions, Personal history of other specified conditions, Personal history of other specified conditions (04/28/2016), Personal history of urinary (tract) infections (08/30/2014), Postpartum depression (06/20/2013), Radiculopathy, cervical region (01/06/2021), Strain of unspecified muscle, fascia and tendon at shoulder and upper arm level, left arm, initial encounter (01/22/2021), Streptococcal pharyngitis (03/04/2018), Tachycardia, unspecified, Unspecified asthma with (acute) exacerbation ([facility]) (11/14/2016), Unspecified asthma with (acute) exacerbation ([facility]) (02/27/2020), Unspecified asthma, uncomplicated ([facility]) (02/08/2015), Unspecified pre-eclampsia, unspecified trimester ([facility]-HCC), and Unspecified symptoms and signs involving the genitourinary system (12/14/2021).    Surgical History  She has a past surgical history that includes Other surgical history (06/20/2013); Tympanostomy tube placement (06/20/2013); Other surgical history  (2013); Other surgical history (2015);  section, classic (2018); and Other surgical history (2015).     Social History  She reports that she has never smoked. She has never used smokeless tobacco. She reports current alcohol use. She reports that she does not use drugs.    Family History  Family History   Problem Relation Name Age of Onset    Hypertension Mother      Diabetes Mother      Heart disease Father      Lung disease Father          Allergies  Delsym, Dextromethorphan, and Breo ellipta [fluticasone furoate-vilanterol]       Physical Exam  Vitals reviewed.   Constitutional:       General: She is not in acute distress.     Appearance: She is not ill-appearing.   HENT:      Right Ear: Tympanic membrane and ear canal normal.      Left Ear: Tympanic membrane and ear canal normal.      Mouth/Throat:      Mouth: Mucous membranes are moist.      Pharynx: Oropharynx is clear. No oropharyngeal exudate or posterior oropharyngeal erythema.   Eyes:      Extraocular Movements: Extraocular movements intact.      Conjunctiva/sclera: Conjunctivae normal.      Pupils: Pupils are equal, round, and reactive to light.   Neck:      Vascular: No carotid bruit.   Cardiovascular:      Rate and Rhythm: Normal rate and regular rhythm.      Heart sounds: No murmur heard.     No gallop.   Pulmonary:      Effort: Pulmonary effort is normal.      Breath sounds: Normal breath sounds. No wheezing, rhonchi or rales.   Abdominal:      General: Abdomen is flat. Bowel sounds are normal.      Palpations: Abdomen is soft.      Tenderness: There is no abdominal tenderness.   Musculoskeletal:      Cervical back: Neck supple.      Left lower leg: No edema.   Skin:     General: Skin is warm and dry.      Findings: No rash.   Neurological:      General: No focal deficit present.      Mental Status: She is alert and oriented to person, place, and time.      Gait: Gait normal.   Psychiatric:         Mood and Affect: Mood  normal.         Behavior: Behavior normal.        Last Recorded Vitals  /70   Pulse 72   Wt 82.5 kg (181 lb 12.8 oz)   SpO2 95%     Relevant Results    Current Outpatient Medications:     albuterol 2.5 mg /3 mL (0.083 %) nebulizer solution, Take 3 mL (2.5 mg) by nebulization 4 times a day as needed for wheezing or shortness of breath., Disp: 90 mL, Rfl: 2    albuterol 90 mcg/actuation inhaler, Inhale., Disp: , Rfl:     BACILLUS COAGULANS-INULIN ORAL, Take 1 capsule by mouth once daily., Disp: , Rfl:     cetirizine (ZyrTEC) 10 mg tablet, TAKE 1 TABLET (10 MG) BY MOUTH ONCE DAILY. TAKE IN THE EVENING BEFORE BEDTIME, Disp: 30 tablet, Rfl: 2    clotrimazole-betamethasone (Lotrisone) cream, Apply 1 Film topically 2 times a day., Disp: , Rfl:     crisaborole (Eucrisa) 2 % ointment, Eucrisa 2 % External Ointment Refills: 0, Disp: , Rfl:     docusate sodium (Colace) 100 mg capsule, Take 1 capsule (100 mg) by mouth 2 times a day as needed for constipation., Disp: 90 capsule, Rfl: 2    estradiol (Climara) 0.025 mg/24 hr patch, APPLY 1 PATCH EXTERNALLY TO THE SKIN WEEKLY AS DIRECTED, Disp: 4 patch, Rfl: 11    estradiol (Estrace) 0.01 % (0.1 mg/gram) vaginal cream, Discard the applicator and apply a pea sized amount to the vaginal opening and just inside the vagina daily for 14 days followed by 3 times/week, Disp: 42.5 g, Rfl: 2    hyaluronan (Hymovis) 24 mg/3 mL injection, Inject into the joint., Disp: , Rfl:     Imvexxy Maintenance Pack 10 mcg insert, INSERT ONE VAGINALLY TWICE WEEKLY, Disp: 1 each, Rfl: 11    multivitamin tablet, Take 1 tablet by mouth once daily., Disp: , Rfl:     semaglutide, weight loss, (Wegovy) 0.5 mg/0.5 mL pen injector, Inject 0.5 mg (1 pen) under the skin 1 (one) time per week., Disp: 6 mL, Rfl: 1    valACYclovir (Valtrex) 500 mg tablet, Take 1 tablet (500 mg) by mouth once daily., Disp: 90 tablet, Rfl: 2    albuterol-budesonide (Airsupra) 90-80 mcg/actuation inhaler, Inhale 2 puffs every 6  hours if needed (wheezing and shortness of breathe). (Patient not taking: Reported on 8/28/2024), Disp: 10.7 g, Rfl: 3    [START ON 9/1/2024] estradiol (Climara) 0.025 mg/24 hr patch, Place 1 patch over 7 days on the skin 1 (one) time per week. (Patient not taking: Reported on 8/28/2024 Do not fill before September 1, 2024.), Disp: 12 patch, Rfl: 3    estradiol (Climara) 0.0375 mg/24 hr patch, Place 1 patch over 7 days on the skin 1 (one) time per week. (Patient not taking: Reported on 8/28/2024), Disp: 12 patch, Rfl: 3    mometasone-formoterol (Dulera) 100-5 mcg/actuation inhaler, Inhale 2 puffs 2 times a day. Rinse mouth with water after use to reduce aftertaste and incidence of candidiasis. Do not swallow. (Patient not taking: Reported on 2/22/2024), Disp: 53 g, Rfl: 3           Assessment/Plan   Diagnoses and all orders for this visit:  Annual physical exam  -     CBC and Auto Differential; Future  -     Comprehensive metabolic panel; Future  -     Lipid Panel; Future  High risk heterosexual behavior  -     HIV 1/2 Antigen/Antibody Screen with Reflex to Confirmation; Future  -     HSV1 IgG and HSV2 IgG; Future  Adult BMI 31.0-31.9 kg/sq m  -     semaglutide, weight loss, (Wegovy) 1 mg/0.5 mL pen injector; Inject 1 mg under the skin 1 (one) time per week for 4 doses.    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 daily     Health Maintenance   Topic Date Due    MMR Vaccines (1 of 1 - Standard series) Never done    Pneumococcal Vaccine: Pediatrics (0 to 5  Years) and At-Risk Patients (6 to 64 Years) (1 of 2 - PCV) Never done    Varicella Vaccines (1 of 2 - 13+ 2-dose series) Never done    Diabetes Screening  Never done    Hepatitis B Vaccines (1 of 3 - 19+ 3-dose series) Never done    DTaP/Tdap/Td Vaccines (1 - Tdap) Never done    COVID-19 Vaccine (4 - 2023-24 season) 09/01/2023    Mammogram  03/28/2024    Influenza Vaccine (1) 09/01/2024    Yearly Adult Physical  05/21/2025    Lipid Panel  08/28/2029    Zoster Vaccines (1 of 2) 06/01/2031    HIV Screening  Completed    Hepatitis C Screening  Completed    HIB Vaccines  Aged Out    IPV Vaccines  Aged Out    Hepatitis A Vaccines  Aged Out    Meningococcal Vaccine  Aged Out    Rotavirus Vaccines  Aged Out    HPV Vaccines  Aged Out            Chapis Olivo,

## 2024-08-29 LAB
HERPES SIMPLEX VIRUS 1 IGG: 0.2 INDEX
HERPES SIMPLEX VIRUS 2 IGG: 6.3 INDEX
HIV 1+2 AB+HIV1 P24 AG SERPL QL IA: NONREACTIVE

## 2024-09-03 ENCOUNTER — ALLIED HEALTH (OUTPATIENT)
Dept: INTEGRATIVE MEDICINE | Facility: CLINIC | Age: 44
End: 2024-09-03
Payer: COMMERCIAL

## 2024-09-03 DIAGNOSIS — M99.02 SEGMENTAL AND SOMATIC DYSFUNCTION OF THORACIC REGION: Primary | ICD-10-CM

## 2024-09-03 DIAGNOSIS — M79.9 POSTURAL STRAIN: ICD-10-CM

## 2024-09-03 DIAGNOSIS — M99.04 SEGMENTAL AND SOMATIC DYSFUNCTION OF SACRAL REGION: ICD-10-CM

## 2024-09-03 DIAGNOSIS — M99.03 SEGMENTAL AND SOMATIC DYSFUNCTION OF LUMBAR REGION: ICD-10-CM

## 2024-09-03 DIAGNOSIS — M54.6 THORACIC SPINE PAIN: ICD-10-CM

## 2024-09-03 DIAGNOSIS — M79.18 MYALGIA, OTHER SITE: ICD-10-CM

## 2024-09-03 PROCEDURE — 97112 NEUROMUSCULAR REEDUCATION: CPT | Performed by: CHIROPRACTOR

## 2024-09-03 PROCEDURE — 98941 CHIROPRACT MANJ 3-4 REGIONS: CPT | Performed by: CHIROPRACTOR

## 2024-09-03 NOTE — PROGRESS NOTES
Subjective   Patient ID: Kriss Jhaveri is a 44 y.o. female who presents September 3, 2024 for chiropractic care.    VPCY    Today, the patient rates their degree of pain as a 4 out of 10 on the numeric pain rating scale.     HPI : Kriss presents to my office for chiropractic care following with main complaints of upper back tension and discomfort. She denies inciting trauma/incident but reports 1-2 months of ongoing tightness and discomfort localized across the upper back and between the shoulder blades. Left lateral thigh and IT band pain has resolved. Notes mild lower back discomfort. Feels that no matter how she adjusts her computer or keyboard, she always feels tightness in her trapezius regions.  No radicular complaints reported. Denies other changes to health.       Objective   Physical Exam  Neurological:      General: No focal deficit present.      Mental Status: She is alert and oriented to person, place, and time.      Cranial Nerves: No dysarthria or facial asymmetry.      Sensory: Sensation is intact.      Motor: Motor function is intact.      Coordination: Coordination is intact.      Gait: Gait is intact. Gait normal.         Palpation of the following region(s) revealed:  Thoracic: Thoracic paraspinals bilateral, hypertonicity and tenderness.  Middle trapezius bilateral, hypertonicity and tenderness.  Rhomboids bilateral, hypertonicity and tenderness.  Pectoralis bilateral, hypertonicity and tenderness.  Lumbar PS - bilateral, hypertonicity and tenderness        Segmental Joint(s): Segmental joint dysfunction was assessed with motion palpation and is identified in the following areas:  Thoracic : T3, T4, T6, T7, and T10  Lumbopelvic / Sacral SIJ : L4, R SIJ, and L SIJ      Assessment/Plan   Today's Treatment Included: Chiropractic manipulation to the  Segmental Joint(s) Lumbopelvic/Sacral SIJ : L4, R SIJ, and L SIJ Segmental Joint(s) Thoracic : T3, T4, T6, T7, and T10   Treatment Techniques Used :  Diversified CMT and Pelvic drop table technique  Neuromuscular Re-Education (61166): Start time: 12:00 pm End time: 12:25 pm  2 Units  Integrative Dry Needling (IDN) - Needles in / out:  4.  IDN: BL upper trap, C7-T1 PS    ART and soft tissue manipulation was performed in the following areas:   Thoracic Paraspinal mm. bilateral, Middle Trapezius bilateral, Rhomboids bilateral, Pectoralis bilateral, and Latissimus Dorsi bilateral, glutes med/max      F/U as needed and beneficial     The patient tolerated today's treatment with little or no additional discomfort and was instructed to contact the office for questions or concerns.

## 2024-09-16 ENCOUNTER — SPECIALTY PHARMACY (OUTPATIENT)
Dept: PHARMACY | Facility: CLINIC | Age: 44
End: 2024-09-16

## 2024-09-16 PROCEDURE — RXMED WILLOW AMBULATORY MEDICATION CHARGE

## 2024-09-17 ENCOUNTER — APPOINTMENT (OUTPATIENT)
Dept: RADIOLOGY | Facility: CLINIC | Age: 44
End: 2024-09-17
Payer: COMMERCIAL

## 2024-09-18 ENCOUNTER — PHARMACY VISIT (OUTPATIENT)
Dept: PHARMACY | Facility: CLINIC | Age: 44
End: 2024-09-18
Payer: MEDICARE

## 2024-09-20 DIAGNOSIS — Z79.890 MENOPAUSAL SYNDROME ON HORMONE REPLACEMENT THERAPY: Primary | ICD-10-CM

## 2024-09-20 DIAGNOSIS — N95.1 MENOPAUSAL SYNDROME ON HORMONE REPLACEMENT THERAPY: Primary | ICD-10-CM

## 2024-09-20 RX ORDER — ESTRADIOL 0.04 MG/D
1 PATCH TRANSDERMAL WEEKLY
Qty: 4 PATCH | Refills: 11 | Status: SHIPPED | OUTPATIENT
Start: 2024-09-20 | End: 2025-09-20

## 2024-09-26 ENCOUNTER — HOSPITAL ENCOUNTER (OUTPATIENT)
Dept: RADIOLOGY | Facility: CLINIC | Age: 44
Discharge: HOME | End: 2024-09-26
Payer: COMMERCIAL

## 2024-09-26 DIAGNOSIS — Z12.31 BREAST CANCER SCREENING BY MAMMOGRAM: ICD-10-CM

## 2024-09-26 PROCEDURE — 77067 SCR MAMMO BI INCL CAD: CPT

## 2024-10-01 ENCOUNTER — PATIENT MESSAGE (OUTPATIENT)
Dept: OBSTETRICS AND GYNECOLOGY | Facility: CLINIC | Age: 44
End: 2024-10-01
Payer: COMMERCIAL

## 2024-10-01 DIAGNOSIS — A60.00 GENITAL HERPES SIMPLEX, UNSPECIFIED SITE: ICD-10-CM

## 2024-10-02 ENCOUNTER — OFFICE VISIT (OUTPATIENT)
Dept: URGENT CARE | Age: 44
End: 2024-10-02
Payer: COMMERCIAL

## 2024-10-02 VITALS
TEMPERATURE: 97.7 F | HEART RATE: 77 BPM | WEIGHT: 179 LBS | OXYGEN SATURATION: 100 % | DIASTOLIC BLOOD PRESSURE: 99 MMHG | SYSTOLIC BLOOD PRESSURE: 154 MMHG | BODY MASS INDEX: 28.04 KG/M2 | RESPIRATION RATE: 16 BRPM

## 2024-10-02 DIAGNOSIS — H92.01 OTALGIA OF RIGHT EAR: Primary | ICD-10-CM

## 2024-10-02 DIAGNOSIS — J02.9 SORE THROAT: ICD-10-CM

## 2024-10-02 LAB — POC RAPID STREP: NEGATIVE

## 2024-10-02 ASSESSMENT — PATIENT HEALTH QUESTIONNAIRE - PHQ9
2. FEELING DOWN, DEPRESSED OR HOPELESS: NOT AT ALL
SUM OF ALL RESPONSES TO PHQ9 QUESTIONS 1 AND 2: 0
1. LITTLE INTEREST OR PLEASURE IN DOING THINGS: NOT AT ALL

## 2024-10-02 ASSESSMENT — PAIN SCALES - GENERAL: PAINLEVEL: 2

## 2024-10-02 NOTE — PROGRESS NOTES
HPI:  Patient states that 2 weeks ago she had irritation in the throat and on the palate that improved, but since yesterday she had a ST more on the right side and right ear pain.  No fever/chills.  No n/v.  No CP or SOB. No known sick contacts.         ROS:  No fever/chills  No cough  No ap  No n/v/diarrhea    PE:    A&O x3  NCAT  PERRLA, EOMI  TM clear bl  No pharyngeal erythema or exudates; no pharyngeal swelling  RRR  CTAB  MOEx4  No focal deficit  Judgement normal  No submandibular nodes    Results:  Strep-    A/P:   Pharyngitis  Right otalgia     Your strep test was negative.  You have pharyngitis that can be secondary to reflux or a viral illness.  Increase fluids.  Gargle with warm water.  Throat lozanges. Avoid spicy/acidic food.  Take antacid. Tylenol as needed for pain.  Keep a diary of symptoms. Recheck with your doctor in 5-7 days if no improvement.  Go to the ER if starts getting worse.

## 2024-10-04 ENCOUNTER — TELEPHONE (OUTPATIENT)
Dept: OBSTETRICS AND GYNECOLOGY | Facility: CLINIC | Age: 44
End: 2024-10-04

## 2024-10-04 DIAGNOSIS — N95.1 MENOPAUSAL SYNDROME ON HORMONE REPLACEMENT THERAPY: Primary | ICD-10-CM

## 2024-10-04 DIAGNOSIS — Z79.890 MENOPAUSAL SYNDROME ON HORMONE REPLACEMENT THERAPY: Primary | ICD-10-CM

## 2024-10-04 DIAGNOSIS — B00.9 HSV INFECTION: Primary | ICD-10-CM

## 2024-10-04 RX ORDER — VALACYCLOVIR HYDROCHLORIDE 1 G/1
TABLET, FILM COATED ORAL
Qty: 90 TABLET | Refills: 4 | Status: SHIPPED | OUTPATIENT
Start: 2024-10-04

## 2024-10-04 RX ORDER — ESTRADIOL 0.05 MG/D
1 FILM, EXTENDED RELEASE TRANSDERMAL 2 TIMES WEEKLY
Qty: 8 PATCH | Refills: 11 | Status: SHIPPED | OUTPATIENT
Start: 2024-10-07 | End: 2025-10-07

## 2024-10-04 NOTE — PROGRESS NOTES
Pt having a worsening of symptoms; estradiol patch increased from 0.0375mg to 0.05mg; pt prefers the twice weekly sandoz brand

## 2024-10-04 NOTE — PROGRESS NOTES
Pt was on daily suppression therapy with 500mg po valtrex; dose increased to 1gram daily d/t continued outbreaks

## 2024-10-07 ENCOUNTER — SPECIALTY PHARMACY (OUTPATIENT)
Dept: PHARMACY | Facility: CLINIC | Age: 44
End: 2024-10-07

## 2024-10-07 RX ORDER — ACYCLOVIR 400 MG/1
TABLET ORAL
Qty: 15 TABLET | Refills: 5 | OUTPATIENT
Start: 2024-10-07 | End: 2025-10-02

## 2024-10-07 RX ORDER — SEMAGLUTIDE 1 MG/.5ML
1 INJECTION, SOLUTION SUBCUTANEOUS WEEKLY
Qty: 2 ML | Refills: 0 | Status: SHIPPED | OUTPATIENT
Start: 2024-10-07 | End: 2024-11-07

## 2024-10-09 ENCOUNTER — APPOINTMENT (OUTPATIENT)
Dept: PRIMARY CARE | Facility: CLINIC | Age: 44
End: 2024-10-09
Payer: COMMERCIAL

## 2024-10-09 PROCEDURE — RXMED WILLOW AMBULATORY MEDICATION CHARGE

## 2024-10-15 ENCOUNTER — SPECIALTY PHARMACY (OUTPATIENT)
Dept: PHARMACY | Facility: CLINIC | Age: 44
End: 2024-10-15

## 2024-10-16 ENCOUNTER — PHARMACY VISIT (OUTPATIENT)
Dept: PHARMACY | Facility: CLINIC | Age: 44
End: 2024-10-16
Payer: MEDICARE

## 2024-10-23 DIAGNOSIS — J30.9 ALLERGIC RHINITIS, UNSPECIFIED SEASONALITY, UNSPECIFIED TRIGGER: ICD-10-CM

## 2024-10-23 RX ORDER — CETIRIZINE HYDROCHLORIDE 10 MG/1
10 TABLET ORAL DAILY
Qty: 30 TABLET | Refills: 2 | Status: SHIPPED | OUTPATIENT
Start: 2024-10-23

## 2024-10-31 RX ORDER — SEMAGLUTIDE 1 MG/.5ML
1 INJECTION, SOLUTION SUBCUTANEOUS WEEKLY
Qty: 2 ML | Refills: 1 | Status: SHIPPED | OUTPATIENT
Start: 2024-10-31 | End: 2024-12-20

## 2024-11-11 ENCOUNTER — OFFICE VISIT (OUTPATIENT)
Dept: PRIMARY CARE | Facility: CLINIC | Age: 44
End: 2024-11-11
Payer: COMMERCIAL

## 2024-11-11 VITALS
BODY MASS INDEX: 28.22 KG/M2 | SYSTOLIC BLOOD PRESSURE: 142 MMHG | WEIGHT: 179.8 LBS | HEART RATE: 80 BPM | DIASTOLIC BLOOD PRESSURE: 100 MMHG | OXYGEN SATURATION: 97 % | HEIGHT: 67 IN

## 2024-11-11 DIAGNOSIS — M54.17 LUMBOSACRAL RADICULOPATHY: ICD-10-CM

## 2024-11-11 DIAGNOSIS — N02.9 THIN BASEMENT MEMBRANE DISEASE: ICD-10-CM

## 2024-11-11 PROBLEM — B34.9 VIRAL ILLNESS: Status: RESOLVED | Noted: 2023-10-04 | Resolved: 2024-11-11

## 2024-11-11 PROBLEM — Z20.822 SUSPECTED COVID-19 VIRUS INFECTION: Status: RESOLVED | Noted: 2023-08-21 | Resolved: 2024-11-11

## 2024-11-11 PROCEDURE — 3078F DIAST BP <80 MM HG: CPT | Performed by: STUDENT IN AN ORGANIZED HEALTH CARE EDUCATION/TRAINING PROGRAM

## 2024-11-11 PROCEDURE — 3008F BODY MASS INDEX DOCD: CPT | Performed by: STUDENT IN AN ORGANIZED HEALTH CARE EDUCATION/TRAINING PROGRAM

## 2024-11-11 PROCEDURE — 99214 OFFICE O/P EST MOD 30 MIN: CPT | Performed by: STUDENT IN AN ORGANIZED HEALTH CARE EDUCATION/TRAINING PROGRAM

## 2024-11-11 PROCEDURE — 3074F SYST BP LT 130 MM HG: CPT | Performed by: STUDENT IN AN ORGANIZED HEALTH CARE EDUCATION/TRAINING PROGRAM

## 2024-11-11 RX ORDER — SEMAGLUTIDE 1.7 MG/.75ML
1.7 INJECTION, SOLUTION SUBCUTANEOUS WEEKLY
Qty: 9 ML | Refills: 0 | Status: SHIPPED | OUTPATIENT
Start: 2024-11-11 | End: 2025-01-28

## 2024-11-11 RX ORDER — SEMAGLUTIDE 1.7 MG/.75ML
1.7 INJECTION, SOLUTION SUBCUTANEOUS WEEKLY
Qty: 9 ML | Refills: 0 | Status: SHIPPED | OUTPATIENT
Start: 2024-11-11 | End: 2024-11-11 | Stop reason: SDUPTHER

## 2024-11-11 RX ORDER — TRETINOIN 0.5 MG/G
CREAM TOPICAL
COMMUNITY
Start: 2024-10-08

## 2024-11-11 NOTE — PROGRESS NOTES
Subjective   Patient ID: Kriss Jhaveri is a 44 y.o. female who presents for Follow-up (Pt is here for a 3 mo follow up.).  HPI   Burning and tingling on the left side laterally. Initially sciatica symptoms  Hx of scoliosis    Menopause symptoms:   Sharp shooting pains/ sensation changes,nerve pain   Radiculomyelopathy  Hot and cold compression  OTC, NSAIDS, tylenol, meloxicam, Biofreeze        Blood Pressure issues   Have been noticing uptrend with home readings   2/2 to renal disease: thin membrane disease  Working with nephrology    Obesity  Wegovy no longer covered for weight loss   HLD, HTN will cover   Pt weight has been doing well with the start of the wegovy since 2/24          Objective   Physical Exam  Musculoskeletal:      Comments: L4/L5 central tenderness   Straight leg testing negative on the R   CYN positive on the Left            Current Outpatient Medications:     albuterol 2.5 mg /3 mL (0.083 %) nebulizer solution, Take 3 mL (2.5 mg) by nebulization 4 times a day as needed for wheezing or shortness of breath., Disp: 90 mL, Rfl: 2    albuterol 90 mcg/actuation inhaler, Inhale., Disp: , Rfl:     BACILLUS COAGULANS-INULIN ORAL, Take 1 capsule by mouth once daily., Disp: , Rfl:     cetirizine (ZyrTEC) 10 mg tablet, TAKE 1 TABLET (10 MG) BY MOUTH ONCE DAILY. TAKE IN THE EVENING BEFORE BEDTIME, Disp: 30 tablet, Rfl: 2    clotrimazole-betamethasone (Lotrisone) cream, Apply 1 Film topically 2 times a day., Disp: , Rfl:     crisaborole (Eucrisa) 2 % ointment, Eucrisa 2 % External Ointment Refills: 0, Disp: , Rfl:     docusate sodium (Colace) 100 mg capsule, Take 1 capsule (100 mg) by mouth 2 times a day as needed for constipation., Disp: 90 capsule, Rfl: 2    estradiol (Estrace) 0.01 % (0.1 mg/gram) vaginal cream, Discard the applicator and apply a pea sized amount to the vaginal opening and just inside the vagina daily for 14 days followed by 3 times/week, Disp: 42.5 g, Rfl: 2    estradiol  (Vivelle-DOT) 0.05 mg/24 hr patch, Place 1 patch over 96 hours on the skin 2 times a week., Disp: 8 patch, Rfl: 11    hyaluronan (Hymovis) 24 mg/3 mL injection, Inject into the joint., Disp: , Rfl:     Imvexxy Maintenance Pack 10 mcg insert, INSERT ONE VAGINALLY TWICE WEEKLY, Disp: 1 each, Rfl: 11    multivitamin tablet, Take 1 tablet by mouth once daily., Disp: , Rfl:     tretinoin (Retin-A) 0.05 % cream, APPLY TO FACE EVERY THIRD NIGHT AND THEN WORK YOUR WAY UP TO EVERY OTHER NIGHT THEN TO NIGHTLY, Disp: , Rfl:     valACYclovir (Valtrex) 1 gram tablet, Take one tablet daily for suppression therapy, increase the dose to twice daily for 5 days for any outbreaks, Disp: 90 tablet, Rfl: 4    semaglutide, weight loss, (Wegovy) 1.7 mg/0.75 mL pen injector, Inject 1.7 mg under the skin 1 (one) time per week for 12 doses., Disp: 9 mL, Rfl: 0      Assessment/Plan   Diagnoses and all orders for this visit:  BMI 28.0-28.9,adult  Thin basement membrane disease  Lumbosacral radiculopathy  -     XR lumbar spine 2-3 views; Future  -     Referral to Pain Medicine; Future       Menopause present  Currently following with OB/GYN  Current regime: estrace vaginal cream, estradiol 0.025mg patch     Overweight   Wegovy was restarted 4 months ago   Will increase to the 1mg weekly dosing due to current plateau   BMI 2816.kg/m2   Previous BMI 28.9 kg/m2   Weight loss since 2/24- 18lbs   Due to back orders- pt supply has been interrupted multiple times      Lumbar Radiculopathy  Radiculomyelopathy  Hot and cold compression  OTC, NSAIDS, tylenol, meloxicam, Biofreeze   Xray lumbar spine   Referral to pain management       HSV-2  Valtrex 500mg daily   Health Maintenance   STI testing ordered   Mammogram ordered  Pap: hysterectomy   Labs: cbc, cmp, lipid panel        Chapis Olivo DO 11/14/24 10:06 PM

## 2024-11-18 ENCOUNTER — HOSPITAL ENCOUNTER (OUTPATIENT)
Dept: RADIOLOGY | Facility: HOSPITAL | Age: 44
Discharge: HOME | End: 2024-11-18
Payer: COMMERCIAL

## 2024-11-18 ENCOUNTER — OFFICE VISIT (OUTPATIENT)
Dept: PAIN MEDICINE | Facility: HOSPITAL | Age: 44
End: 2024-11-18
Payer: COMMERCIAL

## 2024-11-18 DIAGNOSIS — M54.17 LUMBOSACRAL RADICULOPATHY: ICD-10-CM

## 2024-11-18 PROCEDURE — 99213 OFFICE O/P EST LOW 20 MIN: CPT | Performed by: PAIN MEDICINE

## 2024-11-18 PROCEDURE — 72100 X-RAY EXAM L-S SPINE 2/3 VWS: CPT

## 2024-11-18 PROCEDURE — 99203 OFFICE O/P NEW LOW 30 MIN: CPT | Performed by: PAIN MEDICINE

## 2024-11-18 PROCEDURE — 72100 X-RAY EXAM L-S SPINE 2/3 VWS: CPT | Performed by: RADIOLOGY

## 2024-11-18 RX ORDER — METHYLPREDNISOLONE 4 MG/1
TABLET ORAL
Qty: 21 TABLET | Refills: 0 | Status: SHIPPED | OUTPATIENT
Start: 2024-11-18 | End: 2024-11-25

## 2024-11-18 ASSESSMENT — PAIN - FUNCTIONAL ASSESSMENT: PAIN_FUNCTIONAL_ASSESSMENT: 0-10

## 2024-11-18 ASSESSMENT — PAIN SCALES - GENERAL: PAINLEVEL_OUTOF10: 10 - WORST POSSIBLE PAIN

## 2024-11-18 ASSESSMENT — PAIN DESCRIPTION - DESCRIPTORS: DESCRIPTORS: BURNING;TINGLING

## 2024-11-18 NOTE — PROGRESS NOTES
Subjective   Patient ID: Kriss Jhaveri is a 44 y.o. female with a past medical history of hyperlipidemia, hypertension, anxiety, depression who presents with left posterior thigh pain      HPI:   44-year-old female presents with left posterior thigh pain.  Patient was doing hip abduction machine when she felt a pop in her left groin and sharp stabbing pain in the posterior aspect of the leg.  Groin resolved in 1 day but the posterior thigh pain persisted.  Patient describes pain as burning, tingling, 10 out of 10 in severity with no alleviating factors.  Denies weakness, no bowel or bladder incontinence, no dizziness or falls.    Has tried hot and cold compresses, Motrin, Tylenol, topical gel without much relief.  Patient has done physical therapy with only minimal relief. 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      Review of Systems   13-point ROS done and negative except for HPI.     Current Outpatient Medications   Medication Instructions    albuterol 90 mcg/actuation inhaler Inhale.    albuterol 2.5 mg, nebulization, 4 times daily PRN    BACILLUS COAGULANS-INULIN ORAL 1 capsule, Daily    cetirizine (ZYRTEC) 10 mg, oral, Daily, Take in the evening before bedtime    clotrimazole-betamethasone (Lotrisone) cream 1 Film, 2 times daily    crisaborole (Eucrisa) 2 % ointment Eucrisa 2 % External Ointment  Refills: 0    docusate sodium (COLACE) 100 mg, oral, 2 times daily PRN    estradiol (Estrace) 0.01 % (0.1 mg/gram) vaginal cream Discard the applicator and apply a pea sized amount to the vaginal opening and just inside the vagina daily for 14 days followed by 3 times/week    estradiol (Vivelle-DOT) 0.05 mg/24 hr patch 1 patch, transdermal, 2 times weekly    hyaluronan (Hymovis) 24 mg/3 mL injection Inject into the joint.    Imvexxy Maintenance Pack 10 mcg insert INSERT ONE VAGINALLY TWICE WEEKLY    multivitamin tablet 1 tablet, Daily    tretinoin (Retin-A) 0.05 % cream APPLY TO FACE EVERY THIRD  NIGHT AND THEN WORK YOUR WAY UP TO EVERY OTHER NIGHT THEN TO NIGHTLY    valACYclovir (Valtrex) 1 gram tablet Take one tablet daily for suppression therapy, increase the dose to twice daily for 5 days for any outbreaks    Wegovy 1.7 mg, subcutaneous, Weekly       Past Medical History:   Diagnosis Date    Abnormal uterine and vaginal bleeding, unspecified 11/09/2015    Abnormal uterine bleeding (AUB)    Acute maxillary sinusitis, unspecified 04/02/2016    Acute maxillary sinusitis, recurrence not specified    Acute pharyngitis, unspecified 02/24/2016    Sore throat    Acute vaginitis 08/15/2017    Acute vaginitis    Acute vaginitis 03/02/2022    Acute vaginitis    Acute vaginitis 02/02/2018    Bacterial vaginosis    Contact with and (suspected) exposure to infections with a predominantly sexual mode of transmission 03/26/2014    Exposure to STD    Cough, unspecified 04/23/2016    Cough    Cough, unspecified 09/29/2015    Cough    Disruption of external operation (surgical) wound, not elsewhere classified, initial encounter 02/16/2016    Vaginal cuff dehiscence    Encounter for gynecological examination (general) (routine) without abnormal findings 08/15/2017    Encounter for routine gynecological examination    Encounter for gynecological examination (general) (routine) without abnormal findings 09/06/2019    Encounter for routine gynecological examination    Encounter for screening for infections with a predominantly sexual mode of transmission 09/28/2021    Routine screening for STI (sexually transmitted infection)    Encounter for screening for lipoid disorders 05/06/2016    Need for lipid screening    Nausea 09/17/2020    Nausea in adult    Nonspecific chest pain 10/04/2023    Other acute postprocedural pain 01/22/2016    Post-op pain    Other conditions influencing health status     Acute Gonorrhea    Other specified abnormal findings of blood chemistry 07/14/2016    Elevated serum creatinine    Other specified  noninflammatory disorders of uterus     Fibrosis of uterus    Other specified postprocedural states 05/10/2016    Post-operative state    Pain in left knee 04/27/2016    Left knee pain    Pain in unspecified limb     Limb pain    Personal history of other diseases of the circulatory system 03/26/2014    History of hypertension    Personal history of other diseases of the digestive system 02/16/2016    History of constipation    Personal history of other diseases of the female genital tract 08/04/2015    History of vaginitis    Personal history of other diseases of the female genital tract 11/09/2015    History of dysmenorrhea    Personal history of other diseases of the female genital tract 03/20/2015    History of vaginal discharge    Personal history of other diseases of the musculoskeletal system and connective tissue     History of bursitis    Personal history of other diseases of the respiratory system 02/24/2016    History of streptococcal pharyngitis    Personal history of other diseases of the respiratory system 03/04/2018    History of influenza    Personal history of other diseases of the respiratory system     History of bronchitis    Personal history of other infectious and parasitic diseases 09/28/2021    History of candidiasis of vagina    Personal history of other infectious and parasitic diseases 10/12/2016    History of trichomonal vaginitis    Personal history of other medical treatment     History of screening mammography    Personal history of other specified conditions 05/01/2021    History of dysuria    Personal history of other specified conditions 02/08/2015    History of vertigo    Personal history of other specified conditions 12/22/2015    History of urinary frequency    Personal history of other specified conditions 01/16/2015    History of abdominal pain    Personal history of other specified conditions 09/17/2020    History of paresthesia    Personal history of other specified  conditions     History of fever    Personal history of other specified conditions 2017    History of palpitations    Personal history of other specified conditions     History of edema    Personal history of other specified conditions     History of vertigo    Personal history of other specified conditions 2016    History of urinary frequency    Personal history of urinary (tract) infections 2014    History of urinary tract infection    Postpartum depression 2013    Postpartum depression    Radiculopathy, cervical region 2021    Cervical radiculopathy, acute    Strain of unspecified muscle, fascia and tendon at shoulder and upper arm level, left arm, initial encounter 2021    Muscle strain of left shoulder    Streptococcal pharyngitis 2018    Strep pharyngitis    Tachycardia, unspecified     Tachycardia    Unspecified asthma with (acute) exacerbation ([facility]) 2016    Asthma exacerbation    Unspecified asthma with (acute) exacerbation ([facility]) 2020    Asthma exacerbation    Unspecified asthma, uncomplicated ([facility]) 2015    Asthmatic bronchitis    Unspecified pre-eclampsia, unspecified trimester ([facility])     Preeclampsia    Unspecified symptoms and signs involving the genitourinary system 2021    UTI symptoms        Past Surgical History:   Procedure Laterality Date     SECTION, CLASSIC  2018     Section    OTHER SURGICAL HISTORY  2013    General Surgery    OTHER SURGICAL HISTORY  2013    Biopsy Renal    OTHER SURGICAL HISTORY  2015    Hysterectomy Robotic-Assisted    OTHER SURGICAL HISTORY  2015    Laparoscopy Myomectomy    TYMPANOSTOMY TUBE PLACEMENT  2013    Ear Pressure Equalization Tube        Family History   Problem Relation Name Age of Onset    Hypertension Mother      Diabetes Mother      Heart disease Father      Lung disease Father      Dementia Father      Dementia Maternal Grandmother       Breast cancer Father's Sister          Allergies   Allergen Reactions    Delsym Anaphylaxis and Unknown    Dextromethorphan Anaphylaxis and Unknown    Breo Ellipta [Fluticasone Furoate-Vilanterol] Swelling        Objective     There were no vitals filed for this visit.     Physical Exam  General: NAD, well groomed, well nourished  Eyes: Non-icteric sclera, EOMI  Ears, Nose, Mouth, and Throat: External ears and nose appear to be without deformity or rash. No lesions or masses noted. Hearing is grossly intact.   Neck: Trachea midline  Respiratory: Nonlabored breathing   Cardiovascular: no peripheral edema   Skin: No rashes or open lesions/ulcers identified on skin.    Back:   Palpation: No tenderness to palpation over lumbar paraspinous muscles.   Straight leg raise: positive at 40 degrees on the left  CYN Maneuver does not reproduce pain bilaterally    Left Hip: No pain over greater trochanters. and Pain not reproduced with hip internal/external rotation.     Neurologic:   Cranial nerves grossly intact.   Strength 5/5 and symmetric plantar/dorsiflexion   Sensation: Normal to light touch throughout  DTRs:normal and symmetric throughout  Green: absent  Clonus: absent    Psychiatric: Alert, orientation to person, place, and time. Cooperative.    Imaging personally reviewed and independently interpreted:     XR L spine 6/28/19  Mild scoliosis, otherwise unremarkable    Assessment/Plan   44-year-old female who presents with left posterior thigh pain described as burning, tingling, worse with laying sometimes sitting.  On exam patient did not have any weakness but endorsed paresthesias in the posterior aspect of the left leg.  Pain likely secondary to lumbosacral radiculopathy.  Discussed plan to prescribe Medrol Dosepak and get updated x-ray lumbar spine.  Depending on findings, can consider epidural steroid injection at involved level.    Plan:  -Medrol Dosepak  - X-ray lumbar spine  - Continue physical therapy  and home exercise program  - Depending on findings can consider epidural steroid injection at the involved level    The patient has failed treatment with : Physical therapy , three or more classes of medications, and alternative therapies    We discussed  the risks, benefits and alternatives of the procedure including but not limited to: , Lack of efficacy , Transiently worsening pain , Bleeding, Infection , and Nerve Damage    Follow up: After X-rays    The patient was invited to contact us back anytime with any questions or concerns and follow-up with us in the office as needed.     Diagnoses and all orders for this visit:  Lumbosacral radiculopathy  -     Referral to Pain Medicine      This note was generated with the aid of dictation software, there may be typos despite my attempts at proofreading.     Patient seen and plan of care discussed with Dr. Evelio Driscoll MD  Pain Fellow

## 2024-11-26 ENCOUNTER — SPECIALTY PHARMACY (OUTPATIENT)
Dept: PHARMACY | Facility: CLINIC | Age: 44
End: 2024-11-26

## 2024-11-26 PROCEDURE — RXMED WILLOW AMBULATORY MEDICATION CHARGE

## 2024-12-02 ENCOUNTER — APPOINTMENT (OUTPATIENT)
Dept: PRIMARY CARE | Facility: CLINIC | Age: 44
End: 2024-12-02
Payer: COMMERCIAL

## 2024-12-02 ENCOUNTER — PHARMACY VISIT (OUTPATIENT)
Dept: PHARMACY | Facility: CLINIC | Age: 44
End: 2024-12-02
Payer: MEDICARE

## 2024-12-17 ENCOUNTER — SPECIALTY PHARMACY (OUTPATIENT)
Dept: PHARMACY | Facility: CLINIC | Age: 44
End: 2024-12-17

## 2024-12-17 PROCEDURE — RXMED WILLOW AMBULATORY MEDICATION CHARGE

## 2024-12-23 ENCOUNTER — PHARMACY VISIT (OUTPATIENT)
Dept: PHARMACY | Facility: CLINIC | Age: 44
End: 2024-12-23
Payer: MEDICARE

## 2024-12-31 ENCOUNTER — TELEMEDICINE (OUTPATIENT)
Dept: PRIMARY CARE | Facility: CLINIC | Age: 44
End: 2024-12-31
Payer: COMMERCIAL

## 2024-12-31 DIAGNOSIS — R11.0 NAUSEA: ICD-10-CM

## 2024-12-31 DIAGNOSIS — B34.9 VIRAL ILLNESS: Primary | ICD-10-CM

## 2024-12-31 PROCEDURE — 99213 OFFICE O/P EST LOW 20 MIN: CPT | Performed by: STUDENT IN AN ORGANIZED HEALTH CARE EDUCATION/TRAINING PROGRAM

## 2024-12-31 RX ORDER — ONDANSETRON 4 MG/1
4 TABLET, FILM COATED ORAL EVERY 8 HOURS PRN
Qty: 20 TABLET | Refills: 0 | Status: SHIPPED | OUTPATIENT
Start: 2024-12-31 | End: 2025-01-07

## 2024-12-31 NOTE — PROGRESS NOTES
Subjective   Patient ID: Kriss Jhaveri is a 44 y.o. female who presents for No chief complaint on file..  HPI   Son was diagnosed with Flu A last week  Started having symptoms 3 days ago with body aches, Night sweats, coughs (non-productive), very tight chest, congested, nausea,  Medications tried: emergency, Theraflu, nightquil, albuterol use   Improved chest tightness with the albuterol  COVID-19 negative       Current Outpatient Medications:     albuterol 2.5 mg /3 mL (0.083 %) nebulizer solution, Take 3 mL (2.5 mg) by nebulization 4 times a day as needed for wheezing or shortness of breath., Disp: 90 mL, Rfl: 2    albuterol 90 mcg/actuation inhaler, Inhale., Disp: , Rfl:     BACILLUS COAGULANS-INULIN ORAL, Take 1 capsule by mouth once daily., Disp: , Rfl:     cetirizine (ZyrTEC) 10 mg tablet, TAKE 1 TABLET (10 MG) BY MOUTH ONCE DAILY. TAKE IN THE EVENING BEFORE BEDTIME, Disp: 30 tablet, Rfl: 2    clotrimazole-betamethasone (Lotrisone) cream, Apply 1 Film topically 2 times a day., Disp: , Rfl:     crisaborole (Eucrisa) 2 % ointment, Eucrisa 2 % External Ointment Refills: 0, Disp: , Rfl:     docusate sodium (Colace) 100 mg capsule, Take 1 capsule (100 mg) by mouth 2 times a day as needed for constipation., Disp: 90 capsule, Rfl: 2    estradiol (Estrace) 0.01 % (0.1 mg/gram) vaginal cream, Discard the applicator and apply a pea sized amount to the vaginal opening and just inside the vagina daily for 14 days followed by 3 times/week, Disp: 42.5 g, Rfl: 2    estradiol (Vivelle-DOT) 0.05 mg/24 hr patch, Place 1 patch over 96 hours on the skin 2 times a week., Disp: 8 patch, Rfl: 11    hyaluronan (Hymovis) 24 mg/3 mL injection, Inject into the joint., Disp: , Rfl:     Imvexxy Maintenance Pack 10 mcg insert, INSERT ONE VAGINALLY TWICE WEEKLY, Disp: 1 each, Rfl: 11    multivitamin tablet, Take 1 tablet by mouth once daily., Disp: , Rfl:     ondansetron (Zofran) 4 mg tablet, Take 1 tablet (4 mg) by mouth every 8 hours  if needed for nausea for up to 7 days., Disp: 20 tablet, Rfl: 0    semaglutide, weight loss, (Wegovy) 1.7 mg/0.75 mL pen injector, Inject 1.7 mg under the skin 1 (one) time per week for 12 doses., Disp: 9 mL, Rfl: 0    tretinoin (Retin-A) 0.05 % cream, APPLY TO FACE EVERY THIRD NIGHT AND THEN WORK YOUR WAY UP TO EVERY OTHER NIGHT THEN TO NIGHTLY, Disp: , Rfl:     valACYclovir (Valtrex) 1 gram tablet, Take one tablet daily for suppression therapy, increase the dose to twice daily for 5 days for any outbreaks, Disp: 90 tablet, Rfl: 4      Assessment/Plan   Diagnoses and all orders for this visit:  Viral illness  Comments:  likeliy influenza based on son's diagnosis  zofran for the nausea  symptomatic care   albuterol  Nausea  -     ondansetron (Zofran) 4 mg tablet; Take 1 tablet (4 mg) by mouth every 8 hours if needed for nausea for up to 7 days.           Chapis Olivo DO 12/31/24 11:21 AM

## 2025-03-10 ENCOUNTER — APPOINTMENT (OUTPATIENT)
Dept: OBSTETRICS AND GYNECOLOGY | Facility: CLINIC | Age: 45
End: 2025-03-10
Payer: COMMERCIAL

## 2025-03-10 VITALS
HEIGHT: 67 IN | SYSTOLIC BLOOD PRESSURE: 132 MMHG | DIASTOLIC BLOOD PRESSURE: 80 MMHG | WEIGHT: 172 LBS | BODY MASS INDEX: 27 KG/M2

## 2025-03-10 DIAGNOSIS — N89.8 VAGINAL IRRITATION: ICD-10-CM

## 2025-03-10 DIAGNOSIS — R30.0 DYSURIA: Primary | ICD-10-CM

## 2025-03-10 PROCEDURE — 99214 OFFICE O/P EST MOD 30 MIN: CPT | Performed by: ADVANCED PRACTICE MIDWIFE

## 2025-03-10 PROCEDURE — 3075F SYST BP GE 130 - 139MM HG: CPT | Performed by: ADVANCED PRACTICE MIDWIFE

## 2025-03-10 PROCEDURE — 3079F DIAST BP 80-89 MM HG: CPT | Performed by: ADVANCED PRACTICE MIDWIFE

## 2025-03-10 PROCEDURE — 3008F BODY MASS INDEX DOCD: CPT | Performed by: ADVANCED PRACTICE MIDWIFE

## 2025-03-10 RX ORDER — NITROFURANTOIN (MACROCRYSTALS) 100 MG/1
100 CAPSULE ORAL 2 TIMES DAILY
Qty: 10 CAPSULE | Refills: 0 | Status: SHIPPED | OUTPATIENT
Start: 2025-03-10 | End: 2025-03-15

## 2025-03-10 ASSESSMENT — ENCOUNTER SYMPTOMS
CONSTITUTIONAL NEGATIVE: 0
GASTROINTESTINAL NEGATIVE: 0
EYES NEGATIVE: 0
ENDOCRINE NEGATIVE: 0
NEUROLOGICAL NEGATIVE: 0
PSYCHIATRIC NEGATIVE: 0
CARDIOVASCULAR NEGATIVE: 0
ALLERGIC/IMMUNOLOGIC NEGATIVE: 0
HEMATOLOGIC/LYMPHATIC NEGATIVE: 0
RESPIRATORY NEGATIVE: 0
MUSCULOSKELETAL NEGATIVE: 0

## 2025-03-10 NOTE — PROGRESS NOTES
Subjective   Patient ID: Kriss Jhaveri is a 44 y.o. female who presents for Urinary Symptom (FU for Pain with urinating. Full STD testing  /Onur SAM /).  HPI    Perimenopause  On patch vivelle dot  S/p Hyst 2019    Tingling when urinating  Borderline painful  No urgency  No frequency    Review of Systems   Genitourinary:  Positive for dysuria. Negative for difficulty urinating and dyspareunia.   All other systems reviewed and are negative.      Objective   Physical Exam  Constitutional:       Appearance: Normal appearance. She is normal weight.   Abdominal:      General: Abdomen is flat.      Palpations: Abdomen is soft.   Genitourinary:     General: Normal vulva.      Labia:         Right: No rash, tenderness, lesion or injury.         Left: No rash, tenderness, lesion or injury.       Vagina: No vaginal discharge, tenderness or bleeding.      Cervix: No cervical motion tenderness, discharge, friability, lesion or erythema.   Psychiatric:         Mood and Affect: Mood normal.         Behavior: Behavior normal.         Thought Content: Thought content normal.         Judgment: Judgment normal.         Assessment/Plan   Problem List Items Addressed This Visit    None  Visit Diagnoses         Codes    Dysuria    -  Primary R30.0    Relevant Medications    nitrofurantoin (Macrodantin) 100 mg capsule    Other Relevant Orders    Urine Culture    Vaginal irritation     N89.8    Relevant Orders    C. trachomatis / N. gonorrhoeae, Amplified, Urogenital    Vaginitis Gram Stain For Bacterial Vaginosis + Yeast (Completed)                 WADE Del Rosario 03/11/25 2:46 PM

## 2025-03-11 LAB
BV SCORE VAG QL: NORMAL
C TRACH RRNA SPEC QL NAA+PROBE: NOT DETECTED
N GONORRHOEA RRNA SPEC QL NAA+PROBE: NOT DETECTED
QUEST GC CT AMPLIFIED (ALWAYS MESSAGE): NORMAL

## 2025-03-11 ASSESSMENT — ENCOUNTER SYMPTOMS
DIFFICULTY URINATING: 0
DYSURIA: 1

## 2025-03-14 ENCOUNTER — PATIENT MESSAGE (OUTPATIENT)
Dept: OBSTETRICS AND GYNECOLOGY | Facility: CLINIC | Age: 45
End: 2025-03-14
Payer: COMMERCIAL

## 2025-03-14 DIAGNOSIS — T36.95XA ANTIBIOTIC-INDUCED YEAST INFECTION: ICD-10-CM

## 2025-03-14 DIAGNOSIS — B37.31 YEAST VAGINITIS: ICD-10-CM

## 2025-03-14 DIAGNOSIS — B37.9 ANTIBIOTIC-INDUCED YEAST INFECTION: ICD-10-CM

## 2025-03-14 DIAGNOSIS — N95.1 PERIMENOPAUSE: Primary | ICD-10-CM

## 2025-03-17 RX ORDER — FLUCONAZOLE 150 MG/1
150 TABLET ORAL ONCE
Qty: 2 TABLET | Refills: 0 | Status: SHIPPED | OUTPATIENT
Start: 2025-03-17 | End: 2025-03-17 | Stop reason: SDUPTHER

## 2025-03-17 RX ORDER — FLUCONAZOLE 150 MG/1
150 TABLET ORAL ONCE
Qty: 2 TABLET | Refills: 0 | Status: SHIPPED | OUTPATIENT
Start: 2025-03-17 | End: 2025-03-17

## 2025-03-17 RX ORDER — FLUCONAZOLE 150 MG/1
150 TABLET ORAL ONCE
Qty: 1 TABLET | Refills: 0 | Status: SHIPPED | OUTPATIENT
Start: 2025-03-17 | End: 2025-03-17

## 2025-03-17 NOTE — PROGRESS NOTES
Returned patient call:  Patient reports yeast vaginitis symptoms following course of macrobid and metrogel    Also desires switching to combipatch from Vivelle dot.    Patient has history of Hysterectomy- discussed that combi patch most beneficial for patients with intact uterus to protect uterine lining-     Some limited research suggests potential help with hotflashes by using progesterone- patient declines micronized progesterone has sensitivity to peanut- not full blown allergy. Discussed the progestin in combi-patch is not the same as micronized progesterone-     Will switch at this time  Follow up scheduled   In 4-6 weeks     Yamileth KNOTT CNM

## 2025-03-19 ENCOUNTER — APPOINTMENT (OUTPATIENT)
Dept: PRIMARY CARE | Facility: CLINIC | Age: 45
End: 2025-03-19
Payer: COMMERCIAL

## 2025-03-20 ENCOUNTER — HOSPITAL ENCOUNTER (EMERGENCY)
Facility: HOSPITAL | Age: 45
Discharge: HOME | End: 2025-03-20
Payer: COMMERCIAL

## 2025-03-20 ENCOUNTER — APPOINTMENT (OUTPATIENT)
Dept: RADIOLOGY | Facility: HOSPITAL | Age: 45
End: 2025-03-20
Payer: COMMERCIAL

## 2025-03-20 VITALS
SYSTOLIC BLOOD PRESSURE: 138 MMHG | BODY MASS INDEX: 27 KG/M2 | WEIGHT: 172 LBS | RESPIRATION RATE: 20 BRPM | OXYGEN SATURATION: 100 % | TEMPERATURE: 97 F | HEIGHT: 67 IN | HEART RATE: 62 BPM | DIASTOLIC BLOOD PRESSURE: 90 MMHG

## 2025-03-20 DIAGNOSIS — Z90.710 H/O: HYSTERECTOMY: ICD-10-CM

## 2025-03-20 DIAGNOSIS — N93.9 VAGINAL BLEEDING: Primary | ICD-10-CM

## 2025-03-20 LAB
APPEARANCE UR: CLEAR
BILIRUB UR STRIP.AUTO-MCNC: NEGATIVE MG/DL
COLOR UR: ABNORMAL
GLUCOSE UR STRIP.AUTO-MCNC: NORMAL MG/DL
KETONES UR STRIP.AUTO-MCNC: NEGATIVE MG/DL
LEUKOCYTE ESTERASE UR QL STRIP.AUTO: ABNORMAL
MUCOUS THREADS #/AREA URNS AUTO: ABNORMAL /LPF
NITRITE UR QL STRIP.AUTO: NEGATIVE
PH UR STRIP.AUTO: 5.5 [PH]
PROT UR STRIP.AUTO-MCNC: NEGATIVE MG/DL
RBC # UR STRIP.AUTO: ABNORMAL MG/DL
RBC #/AREA URNS AUTO: >20 /HPF
SP GR UR STRIP.AUTO: 1.02
SQUAMOUS #/AREA URNS AUTO: ABNORMAL /HPF
UROBILINOGEN UR STRIP.AUTO-MCNC: NORMAL MG/DL
WBC #/AREA URNS AUTO: ABNORMAL /HPF

## 2025-03-20 PROCEDURE — 76856 US EXAM PELVIC COMPLETE: CPT | Performed by: RADIOLOGY

## 2025-03-20 PROCEDURE — 99284 EMERGENCY DEPT VISIT MOD MDM: CPT | Mod: 25

## 2025-03-20 PROCEDURE — 76830 TRANSVAGINAL US NON-OB: CPT | Performed by: RADIOLOGY

## 2025-03-20 PROCEDURE — 87086 URINE CULTURE/COLONY COUNT: CPT | Mod: AHULAB | Performed by: PHYSICIAN ASSISTANT

## 2025-03-20 PROCEDURE — 81001 URINALYSIS AUTO W/SCOPE: CPT | Performed by: PHYSICIAN ASSISTANT

## 2025-03-20 PROCEDURE — 76830 TRANSVAGINAL US NON-OB: CPT

## 2025-03-20 ASSESSMENT — PAIN SCALES - GENERAL: PAINLEVEL_OUTOF10: 6

## 2025-03-20 ASSESSMENT — PAIN DESCRIPTION - LOCATION: LOCATION: ABDOMEN

## 2025-03-20 ASSESSMENT — PAIN DESCRIPTION - DESCRIPTORS: DESCRIPTORS: CRAMPING

## 2025-03-20 ASSESSMENT — COLUMBIA-SUICIDE SEVERITY RATING SCALE - C-SSRS
2. HAVE YOU ACTUALLY HAD ANY THOUGHTS OF KILLING YOURSELF?: NO
1. IN THE PAST MONTH, HAVE YOU WISHED YOU WERE DEAD OR WISHED YOU COULD GO TO SLEEP AND NOT WAKE UP?: NO
6. HAVE YOU EVER DONE ANYTHING, STARTED TO DO ANYTHING, OR PREPARED TO DO ANYTHING TO END YOUR LIFE?: NO

## 2025-03-20 ASSESSMENT — PAIN DESCRIPTION - ORIENTATION: ORIENTATION: LOWER

## 2025-03-20 ASSESSMENT — PAIN - FUNCTIONAL ASSESSMENT: PAIN_FUNCTIONAL_ASSESSMENT: 0-10

## 2025-03-20 NOTE — ED PROVIDER NOTES
"HPI   Chief Complaint   Patient presents with    Vaginal Bleeding       The patient is a 43 yo female with a PMHx of anxiety, depression, hypertension, asthma, scoliosis, menopause s/p hysterectomy,  presenting due to vaginal bleeding, right sided pelvic pain, and lower abdominal cramping since yesterday. She states the bleeding was spotting yesterday, but the bleeding has increased to occasional \"gushes\" of blood. States the right sided pain is intermittent and sharp. She states she has had a hysterectomy in 2019 - her ovaries and fallopian tubes are still intact. Reports she had a vaginal cuff tear in 2020 and the intermittent right sided pelvic pain started after that, however she has never had the vaginal bleeding associated with it before. She reports she has tried Motrin to help with the pain which has helped some. Denies fever or chills. Denies vaginal lesions or abnormal discharge. Denies risks for STIs. Denies dysuria, urinary frequency, or urinary urgency.  bleeding is very minimal, no dizziness or concern for significant blood loss.  Offered pain medicine but she does not feel that she needs any.              Patient History   Past Medical History:   Diagnosis Date    Abnormal uterine and vaginal bleeding, unspecified 11/09/2015    Abnormal uterine bleeding (AUB)    Acute maxillary sinusitis, unspecified 04/02/2016    Acute maxillary sinusitis, recurrence not specified    Acute pharyngitis, unspecified 02/24/2016    Sore throat    Acute vaginitis 08/15/2017    Acute vaginitis    Acute vaginitis 03/02/2022    Acute vaginitis    Acute vaginitis 02/02/2018    Bacterial vaginosis    Contact with and (suspected) exposure to infections with a predominantly sexual mode of transmission 03/26/2014    Exposure to STD    Cough, unspecified 04/23/2016    Cough    Cough, unspecified 09/29/2015    Cough    Disruption of external operation (surgical) wound, not elsewhere classified, initial encounter 02/16/2016    " Vaginal cuff dehiscence    Encounter for gynecological examination (general) (routine) without abnormal findings 08/15/2017    Encounter for routine gynecological examination    Encounter for gynecological examination (general) (routine) without abnormal findings 09/06/2019    Encounter for routine gynecological examination    Encounter for screening for infections with a predominantly sexual mode of transmission 09/28/2021    Routine screening for STI (sexually transmitted infection)    Encounter for screening for lipoid disorders 05/06/2016    Need for lipid screening    Nausea 09/17/2020    Nausea in adult    Nonspecific chest pain 10/04/2023    Other acute postprocedural pain 01/22/2016    Post-op pain    Other conditions influencing health status     Acute Gonorrhea    Other specified abnormal findings of blood chemistry 07/14/2016    Elevated serum creatinine    Other specified noninflammatory disorders of uterus     Fibrosis of uterus    Other specified postprocedural states 05/10/2016    Post-operative state    Pain in left knee 04/27/2016    Left knee pain    Pain in unspecified limb     Limb pain    Personal history of other diseases of the circulatory system 03/26/2014    History of hypertension    Personal history of other diseases of the digestive system 02/16/2016    History of constipation    Personal history of other diseases of the female genital tract 08/04/2015    History of vaginitis    Personal history of other diseases of the female genital tract 11/09/2015    History of dysmenorrhea    Personal history of other diseases of the female genital tract 03/20/2015    History of vaginal discharge    Personal history of other diseases of the musculoskeletal system and connective tissue     History of bursitis    Personal history of other diseases of the respiratory system 02/24/2016    History of streptococcal pharyngitis    Personal history of other diseases of the respiratory system 03/04/2018     History of influenza    Personal history of other diseases of the respiratory system     History of bronchitis    Personal history of other infectious and parasitic diseases 09/28/2021    History of candidiasis of vagina    Personal history of other infectious and parasitic diseases 10/12/2016    History of trichomonal vaginitis    Personal history of other medical treatment     History of screening mammography    Personal history of other specified conditions 05/01/2021    History of dysuria    Personal history of other specified conditions 02/08/2015    History of vertigo    Personal history of other specified conditions 12/22/2015    History of urinary frequency    Personal history of other specified conditions 01/16/2015    History of abdominal pain    Personal history of other specified conditions 09/17/2020    History of paresthesia    Personal history of other specified conditions     History of fever    Personal history of other specified conditions 06/05/2017    History of palpitations    Personal history of other specified conditions     History of edema    Personal history of other specified conditions     History of vertigo    Personal history of other specified conditions 04/28/2016    History of urinary frequency    Personal history of urinary (tract) infections 08/30/2014    History of urinary tract infection    Postpartum depression 06/20/2013    Postpartum depression    Radiculopathy, cervical region 01/06/2021    Cervical radiculopathy, acute    Strain of unspecified muscle, fascia and tendon at shoulder and upper arm level, left arm, initial encounter 01/22/2021    Muscle strain of left shoulder    Streptococcal pharyngitis 03/04/2018    Strep pharyngitis    Tachycardia, unspecified     Tachycardia    Unspecified asthma with (acute) exacerbation ([facility]) 11/14/2016    Asthma exacerbation    Unspecified asthma with (acute) exacerbation ([facility]) 02/27/2020    Asthma exacerbation    Unspecified  asthma, uncomplicated ([facility]) 2015    Asthmatic bronchitis    Unspecified pre-eclampsia, unspecified trimester ([facility])     Preeclampsia    Unspecified symptoms and signs involving the genitourinary system 2021    UTI symptoms     Past Surgical History:   Procedure Laterality Date     SECTION, CLASSIC  2018     Section    OTHER SURGICAL HISTORY  2013    General Surgery    OTHER SURGICAL HISTORY  2013    Biopsy Renal    OTHER SURGICAL HISTORY  2015    Hysterectomy Robotic-Assisted    OTHER SURGICAL HISTORY  2015    Laparoscopy Myomectomy    TYMPANOSTOMY TUBE PLACEMENT  2013    Ear Pressure Equalization Tube     Family History   Problem Relation Name Age of Onset    Hypertension Mother      Diabetes Mother      Heart disease Father      Lung disease Father      Dementia Father      Dementia Maternal Grandmother      Breast cancer Father's Sister       Social History     Tobacco Use    Smoking status: Never    Smokeless tobacco: Never   Vaping Use    Vaping status: Never Used   Substance Use Topics    Alcohol use: Yes     Comment: 3 times a year    Drug use: Never       Physical Exam   ED Triage Vitals [25 0906]   Temperature Heart Rate Respirations BP   36.1 °C (97 °F) 82 20 (!) 144/97      Pulse Ox Temp src Heart Rate Source Patient Position   99 % -- -- --      BP Location FiO2 (%)     -- --       Physical Exam  Vitals and nursing note reviewed.   Constitutional:       General: She is not in acute distress.     Appearance: Normal appearance. She is normal weight. She is not ill-appearing, toxic-appearing or diaphoretic.   HENT:      Head: Normocephalic and atraumatic.      Right Ear: External ear normal.      Left Ear: External ear normal.      Nose: Nose normal.      Mouth/Throat:      Mouth: Mucous membranes are moist.      Pharynx: No oropharyngeal exudate.   Eyes:      Extraocular Movements: Extraocular movements intact.       Conjunctiva/sclera: Conjunctivae normal.      Pupils: Pupils are equal, round, and reactive to light.   Cardiovascular:      Rate and Rhythm: Normal rate and regular rhythm.   Pulmonary:      Effort: Pulmonary effort is normal. No respiratory distress.      Breath sounds: No stridor.   Abdominal:      General: There is no distension.      Tenderness: There is no right CVA tenderness, left CVA tenderness, guarding or rebound.   Genitourinary:     Comments: deffered  Musculoskeletal:         General: No swelling, tenderness or deformity.      Cervical back: Normal range of motion.   Skin:     General: Skin is warm.      Capillary Refill: Capillary refill takes less than 2 seconds.      Coloration: Skin is not jaundiced or pale.      Findings: No bruising or rash.   Neurological:      General: No focal deficit present.      Mental Status: She is alert and oriented to person, place, and time. Mental status is at baseline.      Cranial Nerves: No cranial nerve deficit.      Sensory: No sensory deficit.      Motor: No weakness.      Coordination: Coordination normal.   Psychiatric:         Mood and Affect: Mood normal.         Behavior: Behavior normal.         Thought Content: Thought content normal.         Judgment: Judgment normal.           ED Course & MDM                  No data recorded     Avtar Coma Scale Score: 15 (03/20/25 1046 : Dom Carlisle RN)                           Medical Decision Making  Differential of tear, UTI, STI, hormonal imbalance,    Ultimately there is no emergent concern for blood loss.  She does still have her ovaries and ultrasound was performed however they appear normal do not suspect torsion.  Will ultimately refer back to primary care/GYN for further evaluation    Escalation of care considered: I considered admission, however given the improvement in symptoms and reassuring workup, patient is appropriate for discharge and outpatient care. Risk of hospitalization outweighs the  benefits. Pt is resting comfortably, well hydrated, tolerating PO, normal neuro exam, lungs CTA, ab soft NTND, not requiring supplemental O2/supportive care/IV medications or IVF, etc.   Ambulating [well/at baseline].  Do not suspect life threatening condition at this time.    Shared Decision made with pt who agrees with plan          Procedure  Procedures     Antoni Hewitt PA-C  03/20/25 [id number]

## 2025-03-20 NOTE — ED TRIAGE NOTES
Pt presents with the c/o vaginal bleeding and cramping. Pt states that she is in menopause and the other day she started having a yellow discharge that had an odor of blood. Pt state that yesterday she started having red bleeding and increase in cramping.

## 2025-03-21 LAB — BACTERIA UR CULT: NORMAL

## 2025-03-24 ENCOUNTER — APPOINTMENT (OUTPATIENT)
Dept: PRIMARY CARE | Facility: CLINIC | Age: 45
End: 2025-03-24
Payer: COMMERCIAL

## 2025-03-24 VITALS
WEIGHT: 176.2 LBS | SYSTOLIC BLOOD PRESSURE: 143 MMHG | OXYGEN SATURATION: 98 % | BODY MASS INDEX: 27.65 KG/M2 | HEIGHT: 67 IN | DIASTOLIC BLOOD PRESSURE: 88 MMHG | HEART RATE: 81 BPM

## 2025-03-24 DIAGNOSIS — N89.8 VAGINAL DISCHARGE: Primary | ICD-10-CM

## 2025-03-24 PROCEDURE — 99213 OFFICE O/P EST LOW 20 MIN: CPT | Performed by: STUDENT IN AN ORGANIZED HEALTH CARE EDUCATION/TRAINING PROGRAM

## 2025-03-24 PROCEDURE — 3008F BODY MASS INDEX DOCD: CPT | Performed by: STUDENT IN AN ORGANIZED HEALTH CARE EDUCATION/TRAINING PROGRAM

## 2025-03-24 PROCEDURE — 3077F SYST BP >= 140 MM HG: CPT | Performed by: STUDENT IN AN ORGANIZED HEALTH CARE EDUCATION/TRAINING PROGRAM

## 2025-03-24 PROCEDURE — 3079F DIAST BP 80-89 MM HG: CPT | Performed by: STUDENT IN AN ORGANIZED HEALTH CARE EDUCATION/TRAINING PROGRAM

## 2025-03-24 RX ORDER — MINOXIDIL 2.5 MG/1
0.5 TABLET ORAL DAILY
COMMUNITY

## 2025-03-24 ASSESSMENT — PATIENT HEALTH QUESTIONNAIRE - PHQ9
1. LITTLE INTEREST OR PLEASURE IN DOING THINGS: NOT AT ALL
SUM OF ALL RESPONSES TO PHQ9 QUESTIONS 1 AND 2: 0
2. FEELING DOWN, DEPRESSED OR HOPELESS: NOT AT ALL

## 2025-03-24 NOTE — PATIENT INSTRUCTIONS
Let's continue with the decreased estrogen for now. I will contact your OB/GYN with our findings for today.

## 2025-03-24 NOTE — PROGRESS NOTES
Subjective   Patient ID:   Kriss Jhaveri is a  44 y.o. female who presents for Follow-up (Kriss Jhaveri is a 44 y.o. female is here today for a follow up from the ED on 3/20/25 for vaginal bleeding.).     HPI   Interval Hx:  Pt was seen by her OB/GYN on 3/10/25 for dysuria/vaginal irritation   At that time she was treated for UTI with nitrofurantoin  She presented on 3/20/25 to the ED at [facility] for vaginal bleeding   Note: pt is s/p hysterectomy and has hx of vaginal cuff tear (2030)  Pelvic Ultrasound: was negative for ovarian lina son or acute abnormalities   Labs:   UA positive for +3 blood  Urine Culture had no growth   3/10/25 neg for BV or yeast on swab  Yellow, grey vaginal drainage with pink   Yesterday and  Cramping with sharp shooting pain, having multiple pads   Pt reports right sided intermittent pain.   Estrogen path was increased to 0.7 ) (pt stopped   Pt admits to being     Medication changes       Current Outpatient Medications:     albuterol 90 mcg/actuation inhaler, Inhale., Disp: , Rfl:     BACILLUS COAGULANS-INULIN ORAL, Take 1 capsule by mouth once daily., Disp: , Rfl:     cetirizine (ZyrTEC) 10 mg tablet, TAKE 1 TABLET (10 MG) BY MOUTH ONCE DAILY. TAKE IN THE EVENING BEFORE BEDTIME, Disp: 30 tablet, Rfl: 2    clotrimazole-betamethasone (Lotrisone) cream, Apply 1 Film topically 2 times a day., Disp: , Rfl:     crisaborole (Eucrisa) 2 % ointment, Eucrisa 2 % External Ointment Refills: 0, Disp: , Rfl:     docusate sodium (Colace) 100 mg capsule, Take 1 capsule (100 mg) by mouth 2 times a day as needed for constipation., Disp: 90 capsule, Rfl: 2    estradiol (Estrace) 0.01 % (0.1 mg/gram) vaginal cream, Discard the applicator and apply a pea sized amount to the vaginal opening and just inside the vagina daily for 14 days followed by 3 times/week, Disp: 42.5 g, Rfl: 2    estradiol-norethindrone acet (Combipatch) 0.05-0.14 mg/24 hr, Place 1 patch over 96 hours on the skin 2  "times a week., Disp: 8 patch, Rfl: 1    hyaluronan (Hymovis) 24 mg/3 mL injection, Inject into the joint., Disp: , Rfl:     Imvexxy Maintenance Pack 10 mcg insert, INSERT ONE VAGINALLY TWICE WEEKLY, Disp: 1 each, Rfl: 11    minoxidil (Loniten) 2.5 mg tablet, Take 0.5 tablets (1.25 mg) by mouth once daily., Disp: , Rfl:     multivitamin tablet, Take 1 tablet by mouth once daily., Disp: , Rfl:     tretinoin (Retin-A) 0.05 % cream, APPLY TO FACE EVERY THIRD NIGHT AND THEN WORK YOUR WAY UP TO EVERY OTHER NIGHT THEN TO NIGHTLY, Disp: , Rfl:     valACYclovir (Valtrex) 1 gram tablet, Take one tablet daily for suppression therapy, increase the dose to twice daily for 5 days for any outbreaks, Disp: 90 tablet, Rfl: 4    albuterol 2.5 mg /3 mL (0.083 %) nebulizer solution, Take 3 mL (2.5 mg) by nebulization 4 times a day as needed for wheezing or shortness of breath., Disp: 90 mL, Rfl: 2    semaglutide, weight loss, (Wegovy) 1.7 mg/0.75 mL pen injector, Inject 1.7 mg under the skin 1 (one) time per week for 12 doses., Disp: 9 mL, Rfl: 0    Visit Vitals  /88   Pulse 81   Ht 1.702 m (5' 7\")   Wt 79.9 kg (176 lb 3.2 oz)   SpO2 98%   BMI 27.60 kg/m²   OB Status Hysterectomy   Smoking Status Never   BSA 1.94 m²       Objective   Physical Exam  Vitals reviewed.   Genitourinary:     Comments: Discharge noted, yellow in color, noted granulation tissue         Granulation tissue and vaginal pouch irritation     Assessment/Plan   Diagnoses and all orders for this visit:  Vaginal discharge  -     C. trachomatis / N. gonorrhoeae, Amplified, Urogenital    Differentials include.: hormonal side effects vs infection vs endometriosis   Reached out to gyn due to granlation tissue, office will call pt for further scheduling.        Chapis Olivo,  03/12/25 2:02 PM   "

## 2025-03-25 LAB — C TRACH RRNA SPEC QL NAA+PROBE: NORMAL

## 2025-03-31 ENCOUNTER — APPOINTMENT (OUTPATIENT)
Dept: OBSTETRICS AND GYNECOLOGY | Facility: CLINIC | Age: 45
End: 2025-03-31
Payer: COMMERCIAL

## 2025-03-31 VITALS
WEIGHT: 172.2 LBS | BODY MASS INDEX: 27.03 KG/M2 | HEIGHT: 67 IN | SYSTOLIC BLOOD PRESSURE: 120 MMHG | DIASTOLIC BLOOD PRESSURE: 72 MMHG

## 2025-03-31 DIAGNOSIS — N89.8 VAGINAL DISCHARGE: Primary | ICD-10-CM

## 2025-03-31 DIAGNOSIS — N95.1 HOT FLASHES DUE TO MENOPAUSE: ICD-10-CM

## 2025-03-31 PROCEDURE — 3008F BODY MASS INDEX DOCD: CPT | Performed by: NURSE PRACTITIONER

## 2025-03-31 PROCEDURE — 3078F DIAST BP <80 MM HG: CPT | Performed by: NURSE PRACTITIONER

## 2025-03-31 PROCEDURE — 99213 OFFICE O/P EST LOW 20 MIN: CPT | Performed by: NURSE PRACTITIONER

## 2025-03-31 PROCEDURE — 1036F TOBACCO NON-USER: CPT | Performed by: NURSE PRACTITIONER

## 2025-03-31 PROCEDURE — 3074F SYST BP LT 130 MM HG: CPT | Performed by: NURSE PRACTITIONER

## 2025-03-31 RX ORDER — ESTRADIOL 0.05 MG/D
1 FILM, EXTENDED RELEASE TRANSDERMAL 2 TIMES WEEKLY
Qty: 24 PATCH | Refills: 4 | Status: SHIPPED | OUTPATIENT
Start: 2025-03-31 | End: 2026-03-31

## 2025-03-31 ASSESSMENT — ENCOUNTER SYMPTOMS
CONSTITUTIONAL NEGATIVE: 0
ENDOCRINE NEGATIVE: 0
EYES NEGATIVE: 0
HEMATOLOGIC/LYMPHATIC NEGATIVE: 0
GASTROINTESTINAL NEGATIVE: 0
MUSCULOSKELETAL NEGATIVE: 0
PSYCHIATRIC NEGATIVE: 0
CARDIOVASCULAR NEGATIVE: 0
ALLERGIC/IMMUNOLOGIC NEGATIVE: 0
RESPIRATORY NEGATIVE: 0
NEUROLOGICAL NEGATIVE: 0

## 2025-03-31 ASSESSMENT — PAIN SCALES - GENERAL: PAINLEVEL_OUTOF10: 0-NO PAIN

## 2025-03-31 NOTE — PROGRESS NOTES
Subjective   Patient ID: Kriss Jhaveri is a 44 y.o. female who presents for No chief complaint on file..  HPI  3 weeks ago had VB, preceded by discharge with odor  ED on 3/20/25: pelvic US-normal   Had a follow up with PCP and yellow discharge was still noted  Continues to have discharge, lighter in flow and not as yellow  Hysterectomy  Would like to go back down to 0.05mg estradiol patch  Review of Systems    Objective   Physical Exam  Genitourinary:     General: Normal vulva.      Comments: Moderate white/yellow thick discharge  Several areas of flat red lesions along the vaginal walls and at the vaginal cuff  Tenderness with cotton tip applicator         Assessment/Plan   Diagnoses and all orders for this visit:  Vaginal discharge  -     Vaginitis Gram Stain For Bacterial Vaginosis + Yeast  -     [facility] MISCELLANEOUS TEST (ROOM TEMPERATURE); Future  -     C. trachomatis / N. gonorrhoeae, Amplified, Urogenital; Future  -     Trichomonas vaginalis, Amplified  Hot flashes due to menopause  -     estradiol (Vivelle-DOT) 0.05 mg/24 hr patch; Place 1 patch over 96 hours on the skin 2 times a week.       Will contact pt with results to discuss plan of care    NIKOS Crawley-CNP 03/31/25 1:04 PM

## 2025-04-01 LAB
BV SCORE VAG QL: NORMAL
T VAGINALIS RRNA SPEC QL NAA+PROBE: NOT DETECTED

## 2025-04-03 LAB — QUEST FLEXITEST1 RESULTS:: NORMAL

## 2025-04-07 ENCOUNTER — TELEPHONE (OUTPATIENT)
Dept: OBSTETRICS AND GYNECOLOGY | Facility: CLINIC | Age: 45
End: 2025-04-07
Payer: COMMERCIAL

## 2025-04-07 DIAGNOSIS — L43.9 LICHEN PLANUS: Primary | ICD-10-CM

## 2025-04-07 NOTE — PROGRESS NOTES
D/t negative vaginitis testing and symptoms of discharge, flat red lesions in the vagina and pain, will treat for LP with compounded hydrocortisone vaginal suppositories and ask her to follow up with me in one month

## 2025-04-08 DIAGNOSIS — L43.9 LICHEN PLANUS: Primary | ICD-10-CM

## 2025-04-09 RX ORDER — HYDROCORTISONE ACETATE 25 MG/1
SUPPOSITORY RECTAL
Qty: 30 SUPPOSITORY | Refills: 0 | Status: SHIPPED | OUTPATIENT
Start: 2025-04-09

## 2025-04-09 NOTE — TELEPHONE ENCOUNTER
Pt verified by name and .  Pt is aware per Leonardo Santiago:  I sent her a message but can you contact her to follow up with me in 5 weeks   Pt has no questions at this time.

## 2025-04-09 NOTE — PROGRESS NOTES
Hydrocortisone 25mg PV for suspected LP prescribed and follow up with me in one month, I also suggested she have a consult with dermatology

## 2025-04-16 ASSESSMENT — ENCOUNTER SYMPTOMS
ANOREXIA: 0
FEVER: 0
HEADACHES: 0
ABDOMINAL PAIN: 0
VOMITING: 0
HEMATURIA: 0
CONSTIPATION: 0
FLANK PAIN: 0
DIARRHEA: 0
DYSURIA: 0
FREQUENCY: 0
CHILLS: 0
SORE THROAT: 0
NAUSEA: 0
BACK PAIN: 0

## 2025-04-17 ENCOUNTER — APPOINTMENT (OUTPATIENT)
Dept: OBSTETRICS AND GYNECOLOGY | Facility: CLINIC | Age: 45
End: 2025-04-17
Payer: COMMERCIAL

## 2025-04-17 VITALS
HEIGHT: 67 IN | SYSTOLIC BLOOD PRESSURE: 149 MMHG | DIASTOLIC BLOOD PRESSURE: 89 MMHG | BODY MASS INDEX: 28.25 KG/M2 | WEIGHT: 180 LBS

## 2025-04-17 DIAGNOSIS — N95.1 HOT FLASHES DUE TO MENOPAUSE: ICD-10-CM

## 2025-04-17 DIAGNOSIS — M62.89 PELVIC FLOOR DYSFUNCTION: ICD-10-CM

## 2025-04-17 DIAGNOSIS — L43.9 LICHEN PLANUS: ICD-10-CM

## 2025-04-17 DIAGNOSIS — Z01.419 ENCOUNTER FOR ANNUAL ROUTINE GYNECOLOGICAL EXAMINATION: Primary | ICD-10-CM

## 2025-04-17 PROCEDURE — 3008F BODY MASS INDEX DOCD: CPT | Performed by: OBSTETRICS & GYNECOLOGY

## 2025-04-17 PROCEDURE — 3079F DIAST BP 80-89 MM HG: CPT | Performed by: OBSTETRICS & GYNECOLOGY

## 2025-04-17 PROCEDURE — 3077F SYST BP >= 140 MM HG: CPT | Performed by: OBSTETRICS & GYNECOLOGY

## 2025-04-17 PROCEDURE — 99396 PREV VISIT EST AGE 40-64: CPT | Performed by: OBSTETRICS & GYNECOLOGY

## 2025-04-17 SDOH — ECONOMIC STABILITY: TRANSPORTATION INSECURITY
IN THE PAST 12 MONTHS, HAS THE LACK OF TRANSPORTATION KEPT YOU FROM MEDICAL APPOINTMENTS OR FROM GETTING MEDICATIONS?: NO

## 2025-04-17 SDOH — ECONOMIC STABILITY: TRANSPORTATION INSECURITY
IN THE PAST 12 MONTHS, HAS LACK OF TRANSPORTATION KEPT YOU FROM MEETINGS, WORK, OR FROM GETTING THINGS NEEDED FOR DAILY LIVING?: NO

## 2025-04-17 ASSESSMENT — ENCOUNTER SYMPTOMS
HEADACHES: 0
GASTROINTESTINAL NEGATIVE: 0
ABDOMINAL PAIN: 0
NEUROLOGICAL NEGATIVE: 0
DYSURIA: 0
RESPIRATORY NEGATIVE: 0
FEVER: 0
OCCASIONAL FEELINGS OF UNSTEADINESS: 0
ENDOCRINE NEGATIVE: 0
CARDIOVASCULAR NEGATIVE: 0
HEMATOLOGIC/LYMPHATIC NEGATIVE: 0
CONSTIPATION: 0
ALLERGIC/IMMUNOLOGIC NEGATIVE: 0
ANOREXIA: 0
EYES NEGATIVE: 0
CHILLS: 0
LOSS OF SENSATION IN FEET: 0
CONSTITUTIONAL NEGATIVE: 0
HEMATURIA: 0
FLANK PAIN: 0
BACK PAIN: 0
MUSCULOSKELETAL NEGATIVE: 0
FREQUENCY: 0
DIARRHEA: 0
DEPRESSION: 0
SORE THROAT: 0
PSYCHIATRIC NEGATIVE: 0
VOMITING: 0
NAUSEA: 0

## 2025-04-17 ASSESSMENT — LIFESTYLE VARIABLES
AUDIT-C TOTAL SCORE: 0
SKIP TO QUESTIONS 9-10: 1
HOW OFTEN DO YOU HAVE A DRINK CONTAINING ALCOHOL: NEVER
HOW OFTEN DO YOU HAVE SIX OR MORE DRINKS ON ONE OCCASION: NEVER
HOW MANY STANDARD DRINKS CONTAINING ALCOHOL DO YOU HAVE ON A TYPICAL DAY: PATIENT DOES NOT DRINK

## 2025-04-17 ASSESSMENT — SOCIAL DETERMINANTS OF HEALTH (SDOH)
HOW HARD IS IT FOR YOU TO PAY FOR THE VERY BASICS LIKE FOOD, HOUSING, MEDICAL CARE, AND HEATING?: NOT VERY HARD
IN THE PAST 12 MONTHS, HAS THE ELECTRIC, GAS, OIL, OR WATER COMPANY THREATENED TO SHUT OFF SERVICE IN YOUR HOME?: NO

## 2025-04-17 ASSESSMENT — PAIN SCALES - GENERAL: PAINLEVEL_OUTOF10: 0-NO PAIN

## 2025-04-17 NOTE — PROGRESS NOTES
Subjective   Patient ID: Kriss Jhaveri is a 44 y.o. female who presents for New Patient Visit (Patient here for some vaginal concerns, and to discuss menopause, Patient has no pain or falls, no other complaints or concerns, last pap 13 wnl last mammogram 24 benign no chaperone needed).  Female  Problem  The patient's primary symptoms include vaginal bleeding and vaginal discharge. This is a new problem. The current episode started more than 1 month ago. The problem occurs rarely. The problem has been resolved. The pain is moderate. The problem affects both sides. She is not pregnant. Pertinent negatives include no abdominal pain, anorexia, back pain, chills, constipation, diarrhea, discolored urine, dysuria, fever, flank pain, frequency, headaches, hematuria, joint pain, joint swelling, nausea, painful intercourse, rash, sore throat, urgency or vomiting. The vaginal discharge was copious, watery, white and yellow. There has been no bleeding. She has not been passing clots. She has not been passing tissue. Nothing aggravates the symptoms. She is sexually active. It is unknown whether or not her partner has an STD. She uses condoms for contraception.     Patient is a 44-year-old female  2 para 1 here to establish care.    She has had a previous hysterectomy for fibroids in .  She retains her ovaries.  Previous myomectomy in .    She was also diagnosed to be in early menopause and is on transdermal estrogen and doing well.  Did not tolerate the oral estrogen well.    She has renal disease and followed by nephrology with chronic hematuria    Her 1 son was delivered by  section at 26 weeks due to severe preeclampsia    Previously seen with vaginal bleeding and vaginal lesions.  Found to have lichen planus and treated with hydrocortisone suppositories and doing much better.  Much improved and no further bleeding  Review of Systems   Constitutional:  Negative for chills and fever.    HENT:  Negative for sore throat.    Gastrointestinal:  Negative for abdominal pain, anorexia, constipation, diarrhea, nausea and vomiting.   Genitourinary:  Positive for vaginal discharge. Negative for dysuria, flank pain, frequency, hematuria and urgency.   Musculoskeletal:  Negative for back pain and joint pain.   Skin:  Negative for rash.   Neurological:  Negative for headaches.       Objective   Physical Exam  Thyroid: No thyroid megaly    Cardiovascular: Regular rate and rhythm    Lungs: Clear to auscultation    Breasts: No skin changes no masses palpated    Abdomen: Soft nontender bowel sounds positive no masses palpated    Extremities nontender no edema    Pelvic exam: External genitalia Bartholin's urethra and Archbold's are normal.  Vaginal exam shows no lesions or discharge.  Pelvic bimanual exam reveals no masses or tenderness.  Few small purpleish lesions at the introitus less than 3 mm.  Vaginal cuff intact with no further vaginal lesions noted.  Pelvic floor discomfort consistent with pelvic floor dysfunction  Assessment/Plan   Pelvic floor pain and referred to physical therapy    Vulvar lichen planus improved with hydrocortisone suppositories    Consider vaginal estrogen on top of her transdermal estrogen if vaginal dryness and dyspareunia persists         Leander Shaw MD 04/17/25 12:35 PM

## 2025-04-23 ENCOUNTER — PATIENT MESSAGE (OUTPATIENT)
Dept: PRIMARY CARE | Facility: CLINIC | Age: 45
End: 2025-04-23
Payer: COMMERCIAL

## 2025-04-23 NOTE — PATIENT COMMUNICATION
I CALLED AND SPOKE WITH THE PT TODAY. I INFORMED HER THAT SOMETIMES INSURANCE COMPANIES WANT PA'S FOR JUSTIFICATION TO PROVIDE WHY THE PT NEEDS THIS MEDICATION.    DR. PAREDES SENT NEW RX FOR WEGOVY OFF TO [facility]. THEY WILL HANDLE THE PA FOR THE WEGOVY.

## 2025-04-25 ENCOUNTER — APPOINTMENT (OUTPATIENT)
Dept: OBSTETRICS AND GYNECOLOGY | Facility: CLINIC | Age: 45
End: 2025-04-25
Payer: COMMERCIAL

## 2025-05-16 ENCOUNTER — OFFICE VISIT (OUTPATIENT)
Dept: URGENT CARE | Age: 45
End: 2025-05-16
Payer: COMMERCIAL

## 2025-05-16 VITALS
SYSTOLIC BLOOD PRESSURE: 158 MMHG | HEART RATE: 77 BPM | WEIGHT: 173 LBS | DIASTOLIC BLOOD PRESSURE: 96 MMHG | OXYGEN SATURATION: 99 % | BODY MASS INDEX: 27.1 KG/M2 | TEMPERATURE: 97.7 F | RESPIRATION RATE: 18 BRPM

## 2025-05-16 DIAGNOSIS — J02.9 SORE THROAT: ICD-10-CM

## 2025-05-16 LAB
POC HUMAN RHINOVIRUS PCR: NEGATIVE
POC INFLUENZA A VIRUS PCR: NEGATIVE
POC INFLUENZA B VIRUS PCR: NEGATIVE
POC RESPIRATORY SYNCYTIAL VIRUS PCR: NEGATIVE
POC STREPTOCOCCUS PYOGENES (GROUP A STREP) PCR: NEGATIVE

## 2025-05-16 RX ORDER — AZITHROMYCIN 250 MG/1
250 TABLET, FILM COATED ORAL DAILY
Qty: 6 TABLET | Refills: 0 | Status: SHIPPED | OUTPATIENT
Start: 2025-05-16 | End: 2025-05-21

## 2025-05-16 ASSESSMENT — ENCOUNTER SYMPTOMS
MUSCULOSKELETAL NEGATIVE: 1
SORE THROAT: 1
RESPIRATORY NEGATIVE: 1
PSYCHIATRIC NEGATIVE: 1
CONSTITUTIONAL NEGATIVE: 1
CARDIOVASCULAR NEGATIVE: 1
GASTROINTESTINAL NEGATIVE: 1

## 2025-05-16 ASSESSMENT — PATIENT HEALTH QUESTIONNAIRE - PHQ9
SUM OF ALL RESPONSES TO PHQ9 QUESTIONS 1 AND 2: 0
2. FEELING DOWN, DEPRESSED OR HOPELESS: NOT AT ALL
1. LITTLE INTEREST OR PLEASURE IN DOING THINGS: NOT AT ALL

## 2025-05-16 ASSESSMENT — PAIN SCALES - GENERAL: PAINLEVEL_OUTOF10: 6

## 2025-05-16 NOTE — PROGRESS NOTES
Subjective   Patient ID: Kriss Jhaveri is a 44 y.o. female. They present today with a chief complaint of Sore Throat (X2 weeks).    History of Present Illness  44-year-old female comes in today with a chief complaint of sore throat x 2 weeks.  Patient states that she has also had bilateral ear pain, but she was more concerned about sore throat.  She denies any fever/chills, cough or other flulike symptoms.  In the last day or so, she stated that she has felt more tired.      Sore Throat   Associated symptoms include ear pain.       Past Medical History  Allergies as of 05/16/2025 - Reviewed 05/16/2025   Allergen Reaction Noted    Delsym Anaphylaxis and Unknown 08/21/2023    Dextromethorphan Anaphylaxis and Unknown 08/21/2023    Breo ellipta [fluticasone furoate-vilanterol] Swelling 10/10/2023       Prescriptions Prior to Admission[1]     Medical History[2]    Surgical History[3]     reports that she has never smoked. She has never been exposed to tobacco smoke. She has never used smokeless tobacco. She reports that she does not currently use alcohol. She reports that she does not use drugs.    Review of Systems  Review of Systems   Constitutional: Negative.    HENT:  Positive for ear pain and sore throat.    Respiratory: Negative.     Cardiovascular: Negative.    Gastrointestinal: Negative.    Genitourinary: Negative.    Musculoskeletal: Negative.    Psychiatric/Behavioral: Negative.     All other systems reviewed and are negative.                                 Objective    Vitals:    05/16/25 1142 05/16/25 1143   BP: (!) 160/109 (!) 158/96   Pulse: 77    Resp: 18    Temp: 36.5 °C (97.7 °F)    SpO2: 99%    Weight: 78.5 kg (173 lb)      No LMP recorded (lmp unknown). Patient has had a hysterectomy.    Physical Exam  Vitals and nursing note reviewed.   Constitutional:       Appearance: Normal appearance. She is normal weight.   HENT:      Head: Normocephalic and atraumatic.      Right Ear: Tympanic membrane  normal.      Left Ear: Tympanic membrane normal.      Nose: Nose normal.      Mouth/Throat:      Mouth: Mucous membranes are moist.      Pharynx: Oropharynx is clear.   Eyes:      Conjunctiva/sclera: Conjunctivae normal.   Cardiovascular:      Rate and Rhythm: Normal rate and regular rhythm.      Pulses: Normal pulses.   Pulmonary:      Effort: Pulmonary effort is normal.      Breath sounds: Normal breath sounds.   Genitourinary:     Comments: No CVA tenderness or pubic pain.  Musculoskeletal:         General: Normal range of motion.   Skin:     General: Skin is warm and dry.      Capillary Refill: Capillary refill takes less than 2 seconds.   Neurological:      General: No focal deficit present.      Mental Status: She is alert and oriented to person, place, and time.   Psychiatric:         Mood and Affect: Mood normal.         Judgment: Judgment normal.         Procedures    Point of Care Test & Imaging Results from this visit  No results found for this visit on 05/16/25.   Imaging  No results found.    Cardiology, Vascular, and Other Imaging  No other imaging results found for the past 2 days      Diagnostic study results (if any) were reviewed by Greg Tavarez PA-C.    Assessment/Plan   Allergies, medications, history, and pertinent labs/EKGs/Imaging reviewed by Greg Tavarez PA-C.     Medical Decision Making  44-year-old female who comes in today with a chief complaint of sore throat and bilateral ear pain x 2 weeks.  Rapid spot fire testing was negative for strep, flu, RSV and rhinovirus.  Due to patient's overall length of symptoms, I will give her a prescription for azithromycin.  Patient stable for discharge and request to go home.  Discharge instructions were given.    Orders and Diagnoses  Diagnoses and all orders for this visit:  Sore throat  -     POCT SPOTFIRE R/ST Panel Mini w/Strep A ([facility]) manually resulted      Medical Admin Record      Patient disposition: Home    Electronically signed  by Greg Tavarez PA-C  12:17 PM           [1] (Not in a hospital admission)  [2]   Past Medical History:  Diagnosis Date    Abnormal uterine and vaginal bleeding, unspecified 11/09/2015    Abnormal uterine bleeding (AUB)    Acute maxillary sinusitis, unspecified 04/02/2016    Acute maxillary sinusitis, recurrence not specified    Acute pharyngitis, unspecified 02/24/2016    Sore throat    Acute vaginitis 08/15/2017    Acute vaginitis    Acute vaginitis 03/02/2022    Acute vaginitis    Acute vaginitis 02/02/2018    Bacterial vaginosis    Contact with and (suspected) exposure to infections with a predominantly sexual mode of transmission 03/26/2014    Exposure to STD    Cough, unspecified 04/23/2016    Cough    Cough, unspecified 09/29/2015    Cough    Disruption of external operation (surgical) wound, not elsewhere classified, initial encounter 02/16/2016    Vaginal cuff dehiscence    Encounter for gynecological examination (general) (routine) without abnormal findings 08/15/2017    Encounter for routine gynecological examination    Encounter for gynecological examination (general) (routine) without abnormal findings 09/06/2019    Encounter for routine gynecological examination    Encounter for screening for infections with a predominantly sexual mode of transmission 09/28/2021    Routine screening for STI (sexually transmitted infection)    Encounter for screening for lipoid disorders 05/06/2016    Need for lipid screening    Nausea 09/17/2020    Nausea in adult    Nonspecific chest pain 10/04/2023    Other acute postprocedural pain 01/22/2016    Post-op pain    Other conditions influencing health status     Acute Gonorrhea    Other specified abnormal findings of blood chemistry 07/14/2016    Elevated serum creatinine    Other specified noninflammatory disorders of uterus     Fibrosis of uterus    Other specified postprocedural states 05/10/2016    Post-operative state    Pain in left knee 04/27/2016    Left knee  pain    Pain in unspecified limb     Limb pain    Personal history of other diseases of the circulatory system 03/26/2014    History of hypertension    Personal history of other diseases of the digestive system 02/16/2016    History of constipation    Personal history of other diseases of the female genital tract 08/04/2015    History of vaginitis    Personal history of other diseases of the female genital tract 11/09/2015    History of dysmenorrhea    Personal history of other diseases of the female genital tract 03/20/2015    History of vaginal discharge    Personal history of other diseases of the musculoskeletal system and connective tissue     History of bursitis    Personal history of other diseases of the respiratory system 02/24/2016    History of streptococcal pharyngitis    Personal history of other diseases of the respiratory system 03/04/2018    History of influenza    Personal history of other diseases of the respiratory system     History of bronchitis    Personal history of other infectious and parasitic diseases 09/28/2021    History of candidiasis of vagina    Personal history of other infectious and parasitic diseases 10/12/2016    History of trichomonal vaginitis    Personal history of other medical treatment     History of screening mammography    Personal history of other specified conditions 05/01/2021    History of dysuria    Personal history of other specified conditions 02/08/2015    History of vertigo    Personal history of other specified conditions 12/22/2015    History of urinary frequency    Personal history of other specified conditions 01/16/2015    History of abdominal pain    Personal history of other specified conditions 09/17/2020    History of paresthesia    Personal history of other specified conditions     History of fever    Personal history of other specified conditions 06/05/2017    History of palpitations    Personal history of other specified conditions     History of  edema    Personal history of other specified conditions     History of vertigo    Personal history of other specified conditions 2016    History of urinary frequency    Personal history of urinary (tract) infections 2014    History of urinary tract infection    Postpartum depression 2013    Postpartum depression    Radiculopathy, cervical region 2021    Cervical radiculopathy, acute    Strain of unspecified muscle, fascia and tendon at shoulder and upper arm level, left arm, initial encounter 2021    Muscle strain of left shoulder    Streptococcal pharyngitis 2018    Strep pharyngitis    Tachycardia, unspecified     Tachycardia    Unspecified asthma with (acute) exacerbation ([facility]) 2016    Asthma exacerbation    Unspecified asthma with (acute) exacerbation ([facility]) 2020    Asthma exacerbation    Unspecified asthma, uncomplicated ([facility]) 2015    Asthmatic bronchitis    Unspecified pre-eclampsia, unspecified trimester ([facility])     Preeclampsia    Unspecified symptoms and signs involving the genitourinary system 2021    UTI symptoms   [3]   Past Surgical History:  Procedure Laterality Date     SECTION, CLASSIC  2018     Section     SECTION, LOW TRANSVERSE  2011    MYOMECTOMY      OTHER SURGICAL HISTORY  2013    General Surgery    OTHER SURGICAL HISTORY  2013    Biopsy Renal    OTHER SURGICAL HISTORY  2015    Hysterectomy Robotic-Assisted    OTHER SURGICAL HISTORY  2015    Laparoscopy Myomectomy    TYMPANOSTOMY TUBE PLACEMENT  2013    Ear Pressure Equalization Tube

## 2025-05-23 ENCOUNTER — APPOINTMENT (OUTPATIENT)
Dept: OBSTETRICS AND GYNECOLOGY | Facility: CLINIC | Age: 45
End: 2025-05-23
Payer: COMMERCIAL

## 2025-05-23 VITALS
SYSTOLIC BLOOD PRESSURE: 120 MMHG | BODY MASS INDEX: 27.25 KG/M2 | WEIGHT: 173.6 LBS | DIASTOLIC BLOOD PRESSURE: 74 MMHG | HEIGHT: 67 IN

## 2025-05-23 DIAGNOSIS — L43.9 LICHEN PLANUS: ICD-10-CM

## 2025-05-23 PROCEDURE — 99213 OFFICE O/P EST LOW 20 MIN: CPT | Performed by: NURSE PRACTITIONER

## 2025-05-23 PROCEDURE — 3078F DIAST BP <80 MM HG: CPT | Performed by: NURSE PRACTITIONER

## 2025-05-23 PROCEDURE — 3008F BODY MASS INDEX DOCD: CPT | Performed by: NURSE PRACTITIONER

## 2025-05-23 PROCEDURE — 1036F TOBACCO NON-USER: CPT | Performed by: NURSE PRACTITIONER

## 2025-05-23 PROCEDURE — 3074F SYST BP LT 130 MM HG: CPT | Performed by: NURSE PRACTITIONER

## 2025-05-23 RX ORDER — HYDROCORTISONE ACETATE 25 MG/1
SUPPOSITORY RECTAL
Qty: 30 SUPPOSITORY | Refills: 1 | Status: SHIPPED | OUTPATIENT
Start: 2025-05-23

## 2025-05-23 ASSESSMENT — ENCOUNTER SYMPTOMS
HEMATOLOGIC/LYMPHATIC NEGATIVE: 0
ALLERGIC/IMMUNOLOGIC NEGATIVE: 0
EYES NEGATIVE: 0
CARDIOVASCULAR NEGATIVE: 0
NEUROLOGICAL NEGATIVE: 0
GASTROINTESTINAL NEGATIVE: 0
PSYCHIATRIC NEGATIVE: 0
CONSTITUTIONAL NEGATIVE: 0
RESPIRATORY NEGATIVE: 0
MUSCULOSKELETAL NEGATIVE: 0
ENDOCRINE NEGATIVE: 0

## 2025-05-23 ASSESSMENT — PAIN SCALES - GENERAL: PAINLEVEL_OUTOF10: 0-NO PAIN

## 2025-05-23 NOTE — PROGRESS NOTES
Subjective   Patient ID: Kriss Jhaveri is a 44 y.o. female who presents for LS.  HPI  Follow up from Lichen Planus  Diagnosed by exam and negative vaginitis testing  I prescribed vaginal hydrocortisone suppositories which have been effective  She is no longer having pain or discomfort   Review of Systems    Objective   Physical Exam  Genitourinary:     General: Normal vulva.      Vagina: Normal.         Assessment/Plan   Diagnoses and all orders for this visit:  Lichen planus  -     hydrocortisone (Anusol-HC) 25 mg suppository; Please insert one suppository into the vagina nightly as needed for lichen planus       Prescription renewed for PRN use and I asked her to contact me with any flare ups    Leonardo Santiago, NIKOS-CNP 05/23/25 4:22 PM

## 2025-07-17 ENCOUNTER — APPOINTMENT (OUTPATIENT)
Dept: OBSTETRICS AND GYNECOLOGY | Facility: CLINIC | Age: 45
End: 2025-07-17
Payer: COMMERCIAL

## 2025-07-17 VITALS
WEIGHT: 180 LBS | HEIGHT: 67 IN | DIASTOLIC BLOOD PRESSURE: 90 MMHG | SYSTOLIC BLOOD PRESSURE: 144 MMHG | BODY MASS INDEX: 28.25 KG/M2

## 2025-07-17 DIAGNOSIS — L43.9 LICHEN PLANUS: ICD-10-CM

## 2025-07-17 DIAGNOSIS — N89.8 VAGINAL IRRITATION: ICD-10-CM

## 2025-07-17 DIAGNOSIS — N95.1 HOT FLASHES DUE TO MENOPAUSE: ICD-10-CM

## 2025-07-17 DIAGNOSIS — M62.89 PELVIC FLOOR DYSFUNCTION: Primary | ICD-10-CM

## 2025-07-17 PROCEDURE — 3080F DIAST BP >= 90 MM HG: CPT | Performed by: OBSTETRICS & GYNECOLOGY

## 2025-07-17 PROCEDURE — 3077F SYST BP >= 140 MM HG: CPT | Performed by: OBSTETRICS & GYNECOLOGY

## 2025-07-17 PROCEDURE — 99214 OFFICE O/P EST MOD 30 MIN: CPT | Performed by: OBSTETRICS & GYNECOLOGY

## 2025-07-17 PROCEDURE — 3008F BODY MASS INDEX DOCD: CPT | Performed by: OBSTETRICS & GYNECOLOGY

## 2025-07-17 RX ORDER — ESTRADIOL 0.1 MG/D
1 FILM, EXTENDED RELEASE TRANSDERMAL 2 TIMES WEEKLY
Qty: 24 PATCH | Refills: 3 | Status: SHIPPED | OUTPATIENT
Start: 2025-07-17 | End: 2026-07-17

## 2025-07-17 ASSESSMENT — ENCOUNTER SYMPTOMS
CARDIOVASCULAR NEGATIVE: 0
PSYCHIATRIC NEGATIVE: 0
HEMATOLOGIC/LYMPHATIC NEGATIVE: 0
ENDOCRINE NEGATIVE: 0
MUSCULOSKELETAL NEGATIVE: 0
ALLERGIC/IMMUNOLOGIC NEGATIVE: 0
CONSTITUTIONAL NEGATIVE: 0
GASTROINTESTINAL NEGATIVE: 0
EYES NEGATIVE: 0
NEUROLOGICAL NEGATIVE: 0
RESPIRATORY NEGATIVE: 0

## 2025-07-17 ASSESSMENT — PAIN SCALES - GENERAL: PAINLEVEL_OUTOF10: 0-NO PAIN

## 2025-07-17 NOTE — PROGRESS NOTES
Subjective   Patient ID: Kriss Jhaveri is a 45 y.o. female who presents for Follow-up (Patient here to discuss sclerosis, no pain or falls, no complaints or concerns, last pap 4/8/13 wnl last mammogram 9/26/24 benign no chaperone needed).  HPI  Patient on steroid suppositories for lichen sclerosus and vaginal vulvar pain.  Feels much improved.  Is not currently on the steroid and has some available if she flares.    She denies any urinary or bowel symptoms.    She was unable to make an appointment with physical therapy for her pelvic floor dysfunction.  Had difficulty contacting by phone and then eventually was distracted by her grandmother's illness and passing away.    Would like to reengage in physical therapy    Also complains of hot flashes on 0.05 transdermal patch  Review of Systems   Genitourinary:  Positive for pelvic pain and vaginal pain.   All other systems reviewed and are negative.  Hot flashes    Objective   Physical Exam  Pelvic exam: External genitalia Bartholin's urethra and Nilwood's normal.  Deferred pelvic exam at patient request due to pain and discomfort  Assessment/Plan   Pelvic floor dysfunction and will again refer to physical therapy    Lichen sclerosis and using steroid suppositories as needed    Menopausal with hot flashes and will increase transdermal patch to 0.1 mg    Follow-up in 1 year or as needed         Leander Shaw MD 07/17/25 9:19 AM

## 2025-07-28 ENCOUNTER — APPOINTMENT (OUTPATIENT)
Dept: PULMONOLOGY | Facility: HOSPITAL | Age: 45
End: 2025-07-28
Payer: COMMERCIAL

## 2025-08-04 ENCOUNTER — ALLIED HEALTH (OUTPATIENT)
Dept: INTEGRATIVE MEDICINE | Facility: CLINIC | Age: 45
End: 2025-08-04
Payer: COMMERCIAL

## 2025-08-04 ENCOUNTER — APPOINTMENT (OUTPATIENT)
Dept: INTEGRATIVE MEDICINE | Facility: CLINIC | Age: 45
End: 2025-08-04
Payer: COMMERCIAL

## 2025-08-04 DIAGNOSIS — M79.12 MYALGIA OF AUXILIARY MUSCLES, HEAD AND NECK: ICD-10-CM

## 2025-08-04 DIAGNOSIS — M99.02 SEGMENTAL AND SOMATIC DYSFUNCTION OF THORACIC REGION: Primary | ICD-10-CM

## 2025-08-04 DIAGNOSIS — M53.3 SACROILIAC DYSFUNCTION: ICD-10-CM

## 2025-08-04 DIAGNOSIS — M99.03 SEGMENTAL AND SOMATIC DYSFUNCTION OF LUMBAR REGION: ICD-10-CM

## 2025-08-04 DIAGNOSIS — M54.6 THORACIC SPINE PAIN: ICD-10-CM

## 2025-08-04 DIAGNOSIS — M99.05 SEGMENTAL AND SOMATIC DYSFUNCTION OF PELVIC REGION: ICD-10-CM

## 2025-08-04 PROCEDURE — 97112 NEUROMUSCULAR REEDUCATION: CPT | Performed by: CHIROPRACTOR

## 2025-08-04 PROCEDURE — 98941 CHIROPRACT MANJ 3-4 REGIONS: CPT | Performed by: CHIROPRACTOR

## 2025-08-04 NOTE — PROGRESS NOTES
Subjective   Patient ID: Kriss Jhaveri is a 45 y.o. female who presents August 4, 2025 for chiropractic care.    VPCY    Today, the patient rates their degree of pain as a 3 out of 10 on the numeric pain rating scale.     HPI : Kriss returns to my office for chiropractic care following our last visit in September 2024. She has been doing really well overall, staying very active in the gym, working with a  and notes 40 lb weight loss. She arrives today as she is feeling tension and discomfort in the shoulders, upper back and trapezius regions. Her lower back has felt okay but tense. Denies other changes to health.       Objective   Physical Exam    Neurological:      General: No focal deficit present.      Mental Status: She is alert and oriented to person, place, and time.      Cranial Nerves: No dysarthria or facial asymmetry.      Sensory: Sensation is intact.      Motor: Motor function is intact.      Coordination: Coordination is intact.      Gait: Gait is intact. Gait normal.         Palpation of the following region(s) revealed:  Upper trapezius - bilateral, hypertonicity and tenderness  Thoracic: Thoracic paraspinals bilateral, hypertonicity and tenderness.  Middle trapezius bilateral, hypertonicity and tenderness.  Rhomboids bilateral, hypertonicity and tenderness.  Pectoralis bilateral, hypertonicity and tenderness.  Lumbar PS - bilateral, hypertonicity and tenderness      Segmental Joint(s): Segmental joint dysfunction was assessed with motion palpation and is identified in the following areas:  Thoracic : T3, T4, T6, and T12  Lumbopelvic / Sacral SIJ : L1, L4, and R SIJ      Assessment/Plan   Today's Treatment Included: Chiropractic manipulation to the  Segmental Joint(s) Lumbopelvic/Sacral SIJ : L1, L4, and R SIJ Segmental Joint(s) Thoracic : T3, T4, T6, and T12   Treatment Techniques Used : Direct Non-force technique, Pelvic drop table technique, and Manual traction  Neuromuscular  Re-Education (03097): Start time: 840 am End time: 855 am  1 Units  Integrative Dry Needling (IDN) - Needles in / out:  2.  IDN: BL upper trap    Cupping to rhomboids, mid/upper trap    ART and soft tissue manipulation was performed in the following areas:   Thoracic Paraspinal mm. bilateral, Middle Trapezius bilateral, Rhomboids bilateral, Pectoralis bilateral, and Latissimus Dorsi bilateral, glutes med/max, lumbar paraspinals      F/U as needed and beneficial     The patient tolerated today's treatment with little or no additional discomfort and was instructed to contact the office for questions or concerns.

## 2025-08-09 DIAGNOSIS — L43.9 LICHEN PLANUS: ICD-10-CM

## 2025-08-11 RX ORDER — HYDROCORTISONE ACETATE 25 MG/1
SUPPOSITORY RECTAL
Qty: 30 SUPPOSITORY | Refills: 1 | Status: SHIPPED | OUTPATIENT
Start: 2025-08-11

## 2026-07-16 ENCOUNTER — APPOINTMENT (OUTPATIENT)
Dept: OBSTETRICS AND GYNECOLOGY | Facility: CLINIC | Age: 46
End: 2026-07-16
Payer: COMMERCIAL